# Patient Record
Sex: FEMALE | Race: WHITE | Employment: FULL TIME | URBAN - METROPOLITAN AREA
[De-identification: names, ages, dates, MRNs, and addresses within clinical notes are randomized per-mention and may not be internally consistent; named-entity substitution may affect disease eponyms.]

---

## 2017-08-04 ENCOUNTER — OFFICE VISIT (OUTPATIENT)
Dept: FAMILY MEDICINE CLINIC | Age: 62
End: 2017-08-04

## 2017-08-04 VITALS
BODY MASS INDEX: 18.58 KG/M2 | HEIGHT: 66 IN | SYSTOLIC BLOOD PRESSURE: 138 MMHG | OXYGEN SATURATION: 98 % | WEIGHT: 115.6 LBS | HEART RATE: 62 BPM | RESPIRATION RATE: 16 BRPM | DIASTOLIC BLOOD PRESSURE: 70 MMHG

## 2017-08-04 DIAGNOSIS — Z12.31 ENCOUNTER FOR SCREENING MAMMOGRAM FOR BREAST CANCER: ICD-10-CM

## 2017-08-04 DIAGNOSIS — Z11.4 SCREENING FOR HIV WITHOUT PRESENCE OF RISK FACTORS: ICD-10-CM

## 2017-08-04 DIAGNOSIS — Z23 NEED FOR PROPHYLACTIC VACCINATION AND INOCULATION AGAINST VARICELLA: ICD-10-CM

## 2017-08-04 DIAGNOSIS — S99.922A INJURY OF LEFT TOE, INITIAL ENCOUNTER: Primary | ICD-10-CM

## 2017-08-04 DIAGNOSIS — Z11.59 NEED FOR HEPATITIS C SCREENING TEST: ICD-10-CM

## 2017-08-04 PROCEDURE — 99213 OFFICE O/P EST LOW 20 MIN: CPT | Performed by: NURSE PRACTITIONER

## 2017-08-04 ASSESSMENT — ENCOUNTER SYMPTOMS
EYES NEGATIVE: 1
COUGH: 0
COLOR CHANGE: 1
CHEST TIGHTNESS: 0
GASTROINTESTINAL NEGATIVE: 1
SHORTNESS OF BREATH: 0

## 2017-08-04 ASSESSMENT — PATIENT HEALTH QUESTIONNAIRE - PHQ9
SUM OF ALL RESPONSES TO PHQ9 QUESTIONS 1 & 2: 0
SUM OF ALL RESPONSES TO PHQ QUESTIONS 1-9: 0
2. FEELING DOWN, DEPRESSED OR HOPELESS: 0
1. LITTLE INTEREST OR PLEASURE IN DOING THINGS: 0

## 2018-03-20 PROBLEM — S72.001A CLOSED RIGHT HIP FRACTURE, INITIAL ENCOUNTER (HCC): Status: ACTIVE | Noted: 2018-03-20

## 2018-03-20 PROBLEM — E87.6 ACUTE HYPOKALEMIA: Status: ACTIVE | Noted: 2018-03-20

## 2018-03-29 ENCOUNTER — HOSPITAL ENCOUNTER (OUTPATIENT)
Dept: GENERAL RADIOLOGY | Age: 63
Discharge: HOME | End: 2018-03-29
Attending: NURSE PRACTITIONER | Admitting: NURSE PRACTITIONER

## 2018-03-29 ENCOUNTER — OFFICE VISIT (OUTPATIENT)
Dept: FAMILY MEDICINE CLINIC | Age: 63
End: 2018-03-29

## 2018-03-29 VITALS
BODY MASS INDEX: 19.19 KG/M2 | DIASTOLIC BLOOD PRESSURE: 70 MMHG | SYSTOLIC BLOOD PRESSURE: 122 MMHG | WEIGHT: 119.4 LBS | OXYGEN SATURATION: 98 % | RESPIRATION RATE: 18 BRPM | HEART RATE: 74 BPM | HEIGHT: 66 IN

## 2018-03-29 DIAGNOSIS — E87.6 HYPOKALEMIA: ICD-10-CM

## 2018-03-29 DIAGNOSIS — Z87.81 S/P RIGHT HIP FRACTURE: Primary | ICD-10-CM

## 2018-03-29 DIAGNOSIS — D64.9 ANEMIA, UNSPECIFIED TYPE: ICD-10-CM

## 2018-03-29 DIAGNOSIS — E83.42 HYPOMAGNESEMIA: ICD-10-CM

## 2018-03-29 DIAGNOSIS — M85.80 OSTEOPENIA, UNSPECIFIED LOCATION: ICD-10-CM

## 2018-03-29 LAB
ALBUMIN SERPL-MCNC: 3.8 GM/DL (ref 3.4–5)
ALP BLD-CCNC: 130 IU/L (ref 40–129)
ALT SERPL-CCNC: 269 U/L (ref 10–40)
ANION GAP SERPL CALCULATED.3IONS-SCNC: 11 MMOL/L (ref 4–16)
AST SERPL-CCNC: 207 IU/L (ref 15–37)
BASOPHILS ABSOLUTE: 0.1 K/CU MM
BASOPHILS RELATIVE PERCENT: 0.6 % (ref 0–1)
BILIRUB SERPL-MCNC: 0.3 MG/DL (ref 0–1)
BUN BLDV-MCNC: 15 MG/DL (ref 6–23)
CALCIUM SERPL-MCNC: 9.6 MG/DL (ref 8.3–10.6)
CHLORIDE BLD-SCNC: 102 MMOL/L (ref 99–110)
CO2: 29 MMOL/L (ref 21–32)
CREAT SERPL-MCNC: 0.7 MG/DL (ref 0.6–1.1)
DIFFERENTIAL TYPE: ABNORMAL
EOSINOPHILS ABSOLUTE: 0.2 K/CU MM
EOSINOPHILS RELATIVE PERCENT: 1.8 % (ref 0–3)
GFR AFRICAN AMERICAN: >60 ML/MIN/1.73M2
GFR NON-AFRICAN AMERICAN: >60 ML/MIN/1.73M2
GLUCOSE FASTING: 103 MG/DL (ref 70–99)
HCT VFR BLD CALC: 32.9 % (ref 37–47)
HEMOGLOBIN: 10.7 GM/DL (ref 12.5–16)
IMMATURE NEUTROPHIL %: 0.3 % (ref 0–0.43)
LYMPHOCYTES ABSOLUTE: 3 K/CU MM
LYMPHOCYTES RELATIVE PERCENT: 33.1 % (ref 24–44)
MAGNESIUM: 1.7 MG/DL (ref 1.8–2.4)
MCH RBC QN AUTO: 30.9 PG (ref 27–31)
MCHC RBC AUTO-ENTMCNC: 32.5 % (ref 32–36)
MCV RBC AUTO: 95.1 FL (ref 78–100)
MONOCYTES ABSOLUTE: 0.7 K/CU MM
MONOCYTES RELATIVE PERCENT: 7.5 % (ref 0–4)
NUCLEATED RBC %: 0 %
PDW BLD-RTO: 13.3 % (ref 11.7–14.9)
PLATELET # BLD: 390 K/CU MM (ref 140–440)
PMV BLD AUTO: 10.5 FL (ref 7.5–11.1)
POTASSIUM SERPL-SCNC: 4.6 MMOL/L (ref 3.5–5.1)
RBC # BLD: 3.46 M/CU MM (ref 4.2–5.4)
SEGMENTED NEUTROPHILS ABSOLUTE COUNT: 5.1 K/CU MM
SEGMENTED NEUTROPHILS RELATIVE PERCENT: 56.7 % (ref 36–66)
SODIUM BLD-SCNC: 142 MMOL/L (ref 135–145)
TOTAL IMMATURE NEUTOROPHIL: 0.03 K/CU MM
TOTAL NUCLEATED RBC: 0 K/CU MM
TOTAL PROTEIN: 7.1 GM/DL (ref 6.4–8.2)
WBC # BLD: 9 K/CU MM (ref 4–10.5)

## 2018-03-29 PROCEDURE — 99215 OFFICE O/P EST HI 40 MIN: CPT | Performed by: NURSE PRACTITIONER

## 2018-03-29 ASSESSMENT — ENCOUNTER SYMPTOMS
ALLERGIC/IMMUNOLOGIC NEGATIVE: 1
EYES NEGATIVE: 1
GASTROINTESTINAL NEGATIVE: 1
RESPIRATORY NEGATIVE: 1
BLOOD IN STOOL: 0

## 2018-03-29 ASSESSMENT — PATIENT HEALTH QUESTIONNAIRE - PHQ9
1. LITTLE INTEREST OR PLEASURE IN DOING THINGS: 0
SUM OF ALL RESPONSES TO PHQ QUESTIONS 1-9: 0
SUM OF ALL RESPONSES TO PHQ9 QUESTIONS 1 & 2: 0
2. FEELING DOWN, DEPRESSED OR HOPELESS: 0

## 2018-03-29 NOTE — PATIENT INSTRUCTIONS
We are committed to providing you the best care possible. If you receive a survey after visiting one of our offices, please take time to share your experience concerning your physician office visit. These surveys are confidential and no health information about you is shared. We are eager to improve for you and we are counting on your feedback to help make that happen. You have orders to get lab work done, we will call you with the results.     Follow up with your ortho specialist as scheduled    Activity as tolerated

## 2018-03-31 ENCOUNTER — TELEPHONE (OUTPATIENT)
Dept: FAMILY MEDICINE CLINIC | Age: 63
End: 2018-03-31

## 2018-04-03 DIAGNOSIS — R94.5 ABNORMAL LIVER FUNCTION: Primary | ICD-10-CM

## 2018-04-16 ENCOUNTER — HOSPITAL ENCOUNTER (OUTPATIENT)
Dept: ULTRASOUND IMAGING | Age: 63
Discharge: HOME | End: 2018-04-16
Attending: NURSE PRACTITIONER | Admitting: NURSE PRACTITIONER

## 2018-04-16 DIAGNOSIS — R94.5 ABNORMAL LIVER FUNCTION: ICD-10-CM

## 2018-04-16 DIAGNOSIS — K76.89 OTHER SPECIFIED DISEASES OF LIVER (CODE): ICD-10-CM

## 2020-10-23 ENCOUNTER — TELEPHONE (OUTPATIENT)
Dept: FAMILY MEDICINE CLINIC | Age: 65
End: 2020-10-23

## 2020-10-23 RX ORDER — NITROFURANTOIN 25; 75 MG/1; MG/1
100 CAPSULE ORAL 2 TIMES DAILY
Qty: 14 CAPSULE | Refills: 0 | Status: SHIPPED | OUTPATIENT
Start: 2020-10-23 | End: 2020-10-30

## 2020-10-23 NOTE — TELEPHONE ENCOUNTER
Patient billy Melchor Mention patient and called and stated that she has UTI and blood in her urine. Patient would like to know if you could send something to [facility] on [address].  Please advise

## 2020-11-13 ENCOUNTER — OFFICE VISIT (OUTPATIENT)
Dept: FAMILY MEDICINE CLINIC | Age: 65
End: 2020-11-13
Payer: COMMERCIAL

## 2020-11-13 VITALS
SYSTOLIC BLOOD PRESSURE: 124 MMHG | DIASTOLIC BLOOD PRESSURE: 70 MMHG | WEIGHT: 111.2 LBS | HEART RATE: 90 BPM | BODY MASS INDEX: 17.87 KG/M2 | OXYGEN SATURATION: 98 % | HEIGHT: 66 IN

## 2020-11-13 PROCEDURE — 3017F COLORECTAL CA SCREEN DOC REV: CPT | Performed by: FAMILY MEDICINE

## 2020-11-13 PROCEDURE — 99214 OFFICE O/P EST MOD 30 MIN: CPT | Performed by: FAMILY MEDICINE

## 2020-11-13 PROCEDURE — G8427 DOCREV CUR MEDS BY ELIG CLIN: HCPCS | Performed by: FAMILY MEDICINE

## 2020-11-13 PROCEDURE — 4004F PT TOBACCO SCREEN RCVD TLK: CPT | Performed by: FAMILY MEDICINE

## 2020-11-13 PROCEDURE — G8484 FLU IMMUNIZE NO ADMIN: HCPCS | Performed by: FAMILY MEDICINE

## 2020-11-13 PROCEDURE — G8419 CALC BMI OUT NRM PARAM NOF/U: HCPCS | Performed by: FAMILY MEDICINE

## 2020-11-13 ASSESSMENT — ENCOUNTER SYMPTOMS
CONSTIPATION: 0
COUGH: 0
BACK PAIN: 0
SHORTNESS OF BREATH: 0
ABDOMINAL PAIN: 0
DIARRHEA: 0
CHEST TIGHTNESS: 0
WHEEZING: 0
SORE THROAT: 0
SINUS PRESSURE: 0

## 2020-11-13 ASSESSMENT — PATIENT HEALTH QUESTIONNAIRE - PHQ9
SUM OF ALL RESPONSES TO PHQ QUESTIONS 1-9: 0
SUM OF ALL RESPONSES TO PHQ9 QUESTIONS 1 & 2: 0
2. FEELING DOWN, DEPRESSED OR HOPELESS: 0
SUM OF ALL RESPONSES TO PHQ QUESTIONS 1-9: 0
1. LITTLE INTEREST OR PLEASURE IN DOING THINGS: 0
SUM OF ALL RESPONSES TO PHQ QUESTIONS 1-9: 0

## 2020-11-13 NOTE — PROGRESS NOTES
Prabha Shaw  [id number]  20    Chief Complaint   Patient presents with   [address]     previous [employer] patient            60years old lady with past medical history of anemia, not take any medication, does smoke tobacco, still driving, still working, denies any complaints      No past medical history on file. Past Surgical History:   Procedure Laterality Date    HIP ARTHROPLASTY Right 2018    Right Hip Hemiarthroplasty     Family History   Problem Relation Age of Onset    Cancer Sister     Other Brother      Social History     Socioeconomic History    Marital status: Single     Spouse name: Not on file    Number of children: Not on file    Years of education: Not on file    Highest education level: Not on file   Occupational History    Not on file   Social Needs    Financial resource strain: Not on file    Food insecurity     Worry: Not on file     Inability: Not on file    Transportation needs     Medical: Not on file     Non-medical: Not on file   Tobacco Use    Smoking status: Current Some Day Smoker     Packs/day: 0.50     Types: Cigarettes     Last attempt to quit: 3/20/2018     Years since quittin.6    Smokeless tobacco: Never Used   Substance and Sexual Activity    Alcohol use:  Yes     Alcohol/week: 1.0 standard drinks     Types: 1 Glasses of wine per week     Comment: glass of wine daily    Drug use: No    Sexual activity: Not on file   Lifestyle    Physical activity     Days per week: Not on file     Minutes per session: Not on file    Stress: Not on file   Relationships    Social connections     Talks on phone: Not on file     Gets together: Not on file     Attends Mu-ism service: Not on file     Active member of club or organization: Not on file     Attends meetings of clubs or organizations: Not on file     Relationship status: Not on file    Intimate partner violence     Fear of current or ex partner: Not on file     Emotionally abused: Not on file Physically abused: Not on file     Forced sexual activity: Not on file   Other Topics Concern    Not on file   Social History Narrative    Not on file       No Known Allergies  Current Outpatient Medications   Medication Sig Dispense Refill    calcium-vitamin D (OSCAL) 250-125 MG-UNIT per tablet Take 1 tablet by mouth daily       No current facility-administered medications for this visit. Review of Systems   Constitutional: Negative for activity change, appetite change, chills, fatigue and fever. HENT: Negative for congestion, postnasal drip, sinus pressure and sore throat. Respiratory: Negative for cough, chest tightness, shortness of breath and wheezing. Cardiovascular: Negative for chest pain and leg swelling. Gastrointestinal: Negative for abdominal pain, constipation and diarrhea. Genitourinary: Negative for dysuria and frequency. Musculoskeletal: Negative for back pain and gait problem. Skin: Negative for rash. Neurological: Negative for dizziness, weakness and headaches. Psychiatric/Behavioral: Negative for agitation, behavioral problems and sleep disturbance. The patient is not nervous/anxious.         Lab Results   Component Value Date    WBC 9.0 03/29/2018    HGB 10.7 (L) 03/29/2018    HCT 32.9 (L) 03/29/2018    MCV 95.1 03/29/2018     03/29/2018     Lab Results   Component Value Date     03/29/2018    K 4.6 03/29/2018     03/29/2018    CO2 29 03/29/2018    BUN 15 03/29/2018    CREATININE 0.7 03/29/2018    GLUCOSE 115 (H) 03/21/2018    CALCIUM 9.6 03/29/2018    PROT 7.1 03/29/2018    LABALBU 3.8 03/29/2018    BILITOT 0.3 03/29/2018    ALKPHOS 130 (H) 03/29/2018     (H) 03/29/2018     (H) 03/29/2018    LABGLOM >60 03/29/2018    GFRAA >60 03/29/2018     Lab Results   Component Value Date    CHOL 187 04/02/2015     Lab Results   Component Value Date    TRIG 72 04/02/2015     Lab Results   Component Value Date    HDL 73 (A) 04/02/2015     Lab Results   Component Value Date    LDLCALC 110 04/02/2015     No results found for: LABA1C  Lab Results   Component Value Date    TSH 1.330 04/02/2015         /70 (Site: Left Upper Arm, Position: Sitting, Cuff Size: Medium Adult)   Pulse 90   Ht 5' 6\" (1.676 m)   Wt 111 lb 3.2 oz (50.4 kg)   SpO2 98%   BMI 17.95 kg/m²     BP Readings from Last 3 Encounters:   11/13/20 124/70   03/29/18 122/70   03/23/18 136/67       Wt Readings from Last 3 Encounters:   11/13/20 111 lb 3.2 oz (50.4 kg)   03/29/18 119 lb 6.4 oz (54.2 kg)   03/22/18 124 lb 3.2 oz (56.3 kg)         Physical Exam  Constitutional:       General: She is not in acute distress. Appearance: Normal appearance. She is well-developed. She is not ill-appearing or diaphoretic. HENT:      Head: Normocephalic and atraumatic. Eyes:      General: No scleral icterus. Pupils: Pupils are equal, round, and reactive to light. Neck:      Musculoskeletal: Normal range of motion and neck supple. Cardiovascular:      Rate and Rhythm: Normal rate and regular rhythm. Heart sounds: Normal heart sounds. No murmur. Pulmonary:      Effort: Pulmonary effort is normal.      Breath sounds: Normal breath sounds. No wheezing or rales. Abdominal:      General: There is no distension. Palpations: Abdomen is soft. There is no mass. Tenderness: There is no abdominal tenderness. Musculoskeletal: Normal range of motion. Right lower leg: No edema. Left lower leg: No edema. Neurological:      General: No focal deficit present. Mental Status: She is alert and oriented to person, place, and time. Psychiatric:         Mood and Affect: Mood normal.         Behavior: Behavior normal.         Thought Content: Thought content normal.         Judgment: Judgment normal.         ASSESSMENT/ PLAN:    1. Anemia, unspecified type  - check blood work  - Comprehensive Metabolic Panel, Fasting;  Future  - CBC Auto Differential; Future  - T4, Free; Future  - TSH without Reflex; Future  - Vitamin B12 & Folate; Future  - MAGNESIUM; Future    2. Lipid screening  - Lipid Panel; Future    3. Colon cancer screening  - [facility] - Kareem Abdullahi MD, Gastroenterology, [facility]  -Does has colonoscopy in 2015 and recommend to do it in 5 years    4. Encounter for screening mammogram for breast cancer  - [facility] DIGITAL SCREEN W CAD BILATERAL; Future              - All old blood work reviewed with the patient  - Appropriate prescription are addressed. - After visit summery provided. - Questions answered and patient verbalizes understanding.  - Call for any problem, questions, or concerns.          Return in about 1 year (around 11/13/2021) for physical.

## 2020-11-17 RX ORDER — TRAMADOL HYDROCHLORIDE 50 MG/1
50 TABLET ORAL EVERY 6 HOURS PRN
Qty: 7 TABLET | Refills: 0 | OUTPATIENT
Start: 2020-11-17 | End: 2020-11-27

## 2020-11-18 DIAGNOSIS — D64.9 ANEMIA, UNSPECIFIED TYPE: ICD-10-CM

## 2020-11-18 DIAGNOSIS — Z13.220 LIPID SCREENING: ICD-10-CM

## 2020-11-18 LAB
A/G RATIO: 1.5 (ref 1.1–2.2)
ALBUMIN SERPL-MCNC: 4.5 G/DL (ref 3.4–5)
ALP BLD-CCNC: 73 U/L (ref 40–129)
ALT SERPL-CCNC: 23 U/L (ref 10–40)
ANION GAP SERPL CALCULATED.3IONS-SCNC: 10 MMOL/L (ref 3–16)
AST SERPL-CCNC: 24 U/L (ref 15–37)
BASOPHILS ABSOLUTE: 0.1 K/UL (ref 0–0.2)
BASOPHILS RELATIVE PERCENT: 1.1 %
BILIRUB SERPL-MCNC: 0.4 MG/DL (ref 0–1)
BUN BLDV-MCNC: 13 MG/DL (ref 7–20)
CALCIUM SERPL-MCNC: 10.3 MG/DL (ref 8.3–10.6)
CHLORIDE BLD-SCNC: 103 MMOL/L (ref 99–110)
CHOLESTEROL, TOTAL: 174 MG/DL (ref 0–199)
CO2: 28 MMOL/L (ref 21–32)
CREAT SERPL-MCNC: 0.6 MG/DL (ref 0.6–1.2)
EOSINOPHILS ABSOLUTE: 0.2 K/UL (ref 0–0.6)
EOSINOPHILS RELATIVE PERCENT: 2 %
FOLATE: 11.4 NG/ML (ref 4.78–24.2)
GFR AFRICAN AMERICAN: >60
GFR NON-AFRICAN AMERICAN: >60
GLOBULIN: 3 G/DL
GLUCOSE FASTING: 114 MG/DL (ref 70–99)
HCT VFR BLD CALC: 44.4 % (ref 36–48)
HDLC SERPL-MCNC: 67 MG/DL (ref 40–60)
HEMOGLOBIN: 15 G/DL (ref 12–16)
LDL CHOLESTEROL CALCULATED: 93 MG/DL
LYMPHOCYTES ABSOLUTE: 2.3 K/UL (ref 1–5.1)
LYMPHOCYTES RELATIVE PERCENT: 30.5 %
MAGNESIUM: 1.7 MG/DL (ref 1.8–2.4)
MCH RBC QN AUTO: 30.2 PG (ref 26–34)
MCHC RBC AUTO-ENTMCNC: 33.6 G/DL (ref 31–36)
MCV RBC AUTO: 89.7 FL (ref 80–100)
MONOCYTES ABSOLUTE: 0.5 K/UL (ref 0–1.3)
MONOCYTES RELATIVE PERCENT: 6.4 %
NEUTROPHILS ABSOLUTE: 4.5 K/UL (ref 1.7–7.7)
NEUTROPHILS RELATIVE PERCENT: 60 %
PDW BLD-RTO: 13.3 % (ref 12.4–15.4)
PLATELET # BLD: 240 K/UL (ref 135–450)
PMV BLD AUTO: 9.8 FL (ref 5–10.5)
POTASSIUM SERPL-SCNC: 4.6 MMOL/L (ref 3.5–5.1)
RBC # BLD: 4.96 M/UL (ref 4–5.2)
SODIUM BLD-SCNC: 141 MMOL/L (ref 136–145)
T4 FREE: 1.3 NG/DL (ref 0.9–1.8)
TOTAL PROTEIN: 7.5 G/DL (ref 6.4–8.2)
TRIGL SERPL-MCNC: 71 MG/DL (ref 0–150)
TSH SERPL DL<=0.05 MIU/L-ACNC: 1.07 UIU/ML (ref 0.27–4.2)
VITAMIN B-12: 521 PG/ML (ref 211–911)
VLDLC SERPL CALC-MCNC: 14 MG/DL
WBC # BLD: 7.4 K/UL (ref 4–11)

## 2020-11-30 RX ORDER — LANOLIN ALCOHOL/MO/W.PET/CERES
400 CREAM (GRAM) TOPICAL 2 TIMES DAILY
Qty: 20 TABLET | Refills: 0 | Status: SHIPPED | OUTPATIENT
Start: 2020-11-30 | End: 2021-11-19

## 2020-12-16 DIAGNOSIS — E83.42 HYPOMAGNESEMIA: ICD-10-CM

## 2020-12-16 DIAGNOSIS — R73.9 HYPERGLYCEMIA: ICD-10-CM

## 2020-12-16 LAB — MAGNESIUM: 1.8 MG/DL (ref 1.8–2.4)

## 2020-12-17 LAB
ESTIMATED AVERAGE GLUCOSE: 119.8 MG/DL
HBA1C MFR BLD: 5.8 %

## 2021-09-18 ENCOUNTER — HOSPITAL ENCOUNTER (OUTPATIENT)
Age: 66
Setting detail: OBSERVATION
Discharge: HOME | End: 2021-09-19
Attending: EMERGENCY MEDICINE | Admitting: INTERNAL MEDICINE
Payer: COMMERCIAL

## 2021-09-18 ENCOUNTER — APPOINTMENT (OUTPATIENT)
Dept: GENERAL RADIOLOGY | Age: 66
End: 2021-09-18
Payer: COMMERCIAL

## 2021-09-18 ENCOUNTER — APPOINTMENT (OUTPATIENT)
Dept: CT IMAGING | Age: 66
End: 2021-09-18
Payer: COMMERCIAL

## 2021-09-18 ENCOUNTER — APPOINTMENT (OUTPATIENT)
Dept: MRI IMAGING | Age: 66
End: 2021-09-18
Payer: COMMERCIAL

## 2021-09-18 DIAGNOSIS — R53.1 RIGHT SIDED WEAKNESS: Primary | ICD-10-CM

## 2021-09-18 PROBLEM — I63.9 STROKE DETERMINED BY CLINICAL ASSESSMENT (HCC): Status: ACTIVE | Noted: 2021-09-18

## 2021-09-18 LAB
ALBUMIN SERPL-MCNC: 3.3 GM/DL (ref 3.4–5)
ALP BLD-CCNC: 57 IU/L (ref 40–128)
ALT SERPL-CCNC: 14 U/L (ref 10–40)
ANION GAP SERPL CALCULATED.3IONS-SCNC: 11 MMOL/L (ref 4–16)
AST SERPL-CCNC: 16 IU/L (ref 15–37)
BASOPHILS ABSOLUTE: 0.1 K/CU MM
BASOPHILS RELATIVE PERCENT: 0.8 % (ref 0–1)
BILIRUB SERPL-MCNC: 0.4 MG/DL (ref 0–1)
BUN BLDV-MCNC: 16 MG/DL (ref 6–23)
CALCIUM SERPL-MCNC: 8.9 MG/DL (ref 8.3–10.6)
CHLORIDE BLD-SCNC: 100 MMOL/L (ref 99–110)
CHP ED QC CHECK: YES
CO2: 24 MMOL/L (ref 21–32)
CREAT SERPL-MCNC: 0.7 MG/DL (ref 0.6–1.1)
DIFFERENTIAL TYPE: ABNORMAL
EOSINOPHILS ABSOLUTE: 0.2 K/CU MM
EOSINOPHILS RELATIVE PERCENT: 3 % (ref 0–3)
GFR AFRICAN AMERICAN: >60 ML/MIN/1.73M2
GFR NON-AFRICAN AMERICAN: >60 ML/MIN/1.73M2
GLUCOSE BLD-MCNC: 92 MG/DL
GLUCOSE BLD-MCNC: 92 MG/DL (ref 70–99)
GLUCOSE BLD-MCNC: 99 MG/DL (ref 70–99)
HCT VFR BLD CALC: 38.4 % (ref 37–47)
HEMOGLOBIN: 12.7 GM/DL (ref 12.5–16)
IMMATURE NEUTROPHIL %: 0.4 % (ref 0–0.43)
INR BLD: 1.03 INDEX
LYMPHOCYTES ABSOLUTE: 3.4 K/CU MM
LYMPHOCYTES RELATIVE PERCENT: 46 % (ref 24–44)
MCH RBC QN AUTO: 30.1 PG (ref 27–31)
MCHC RBC AUTO-ENTMCNC: 33.1 % (ref 32–36)
MCV RBC AUTO: 91 FL (ref 78–100)
MONOCYTES ABSOLUTE: 0.5 K/CU MM
MONOCYTES RELATIVE PERCENT: 7 % (ref 0–4)
NUCLEATED RBC %: 0 %
PDW BLD-RTO: 13.2 % (ref 11.7–14.9)
PLATELET # BLD: 206 K/CU MM (ref 140–440)
PMV BLD AUTO: 10.5 FL (ref 7.5–11.1)
POTASSIUM SERPL-SCNC: 3.7 MMOL/L (ref 3.5–5.1)
PROTHROMBIN TIME: 13.3 SECONDS (ref 11.7–14.5)
RBC # BLD: 4.22 M/CU MM (ref 4.2–5.4)
SEGMENTED NEUTROPHILS ABSOLUTE COUNT: 3.1 K/CU MM
SEGMENTED NEUTROPHILS RELATIVE PERCENT: 42.8 % (ref 36–66)
SODIUM BLD-SCNC: 135 MMOL/L (ref 135–145)
TOTAL IMMATURE NEUTOROPHIL: 0.03 K/CU MM
TOTAL NUCLEATED RBC: 0 K/CU MM
TOTAL PROTEIN: 6 GM/DL (ref 6.4–8.2)
TROPONIN T: <0.01 NG/ML
WBC # BLD: 7.3 K/CU MM (ref 4–10.5)

## 2021-09-18 PROCEDURE — 71045 X-RAY EXAM CHEST 1 VIEW: CPT

## 2021-09-18 PROCEDURE — 70450 CT HEAD/BRAIN W/O DYE: CPT

## 2021-09-18 PROCEDURE — G0378 HOSPITAL OBSERVATION PER HR: HCPCS

## 2021-09-18 PROCEDURE — 85025 COMPLETE CBC W/AUTO DIFF WBC: CPT

## 2021-09-18 PROCEDURE — 85610 PROTHROMBIN TIME: CPT

## 2021-09-18 PROCEDURE — 99285 EMERGENCY DEPT VISIT HI MDM: CPT

## 2021-09-18 PROCEDURE — 82962 GLUCOSE BLOOD TEST: CPT

## 2021-09-18 PROCEDURE — 6360000004 HC RX CONTRAST MEDICATION: Performed by: EMERGENCY MEDICINE

## 2021-09-18 PROCEDURE — 80053 COMPREHEN METABOLIC PANEL: CPT

## 2021-09-18 PROCEDURE — 70496 CT ANGIOGRAPHY HEAD: CPT

## 2021-09-18 PROCEDURE — 36415 COLL VENOUS BLD VENIPUNCTURE: CPT

## 2021-09-18 PROCEDURE — 93005 ELECTROCARDIOGRAM TRACING: CPT | Performed by: EMERGENCY MEDICINE

## 2021-09-18 PROCEDURE — 84484 ASSAY OF TROPONIN QUANT: CPT

## 2021-09-18 PROCEDURE — 6370000000 HC RX 637 (ALT 250 FOR IP): Performed by: FAMILY MEDICINE

## 2021-09-18 PROCEDURE — 2580000003 HC RX 258: Performed by: FAMILY MEDICINE

## 2021-09-18 PROCEDURE — 70551 MRI BRAIN STEM W/O DYE: CPT

## 2021-09-18 RX ORDER — ACETAMINOPHEN 325 MG/1
650 TABLET ORAL EVERY 6 HOURS PRN
Status: DISCONTINUED | OUTPATIENT
Start: 2021-09-18 | End: 2021-09-19 | Stop reason: HOSPADM

## 2021-09-18 RX ORDER — SODIUM CHLORIDE 9 MG/ML
25 INJECTION, SOLUTION INTRAVENOUS PRN
Status: DISCONTINUED | OUTPATIENT
Start: 2021-09-18 | End: 2021-09-19 | Stop reason: HOSPADM

## 2021-09-18 RX ORDER — POLYETHYLENE GLYCOL 3350 17 G/17G
17 POWDER, FOR SOLUTION ORAL DAILY PRN
Status: DISCONTINUED | OUTPATIENT
Start: 2021-09-18 | End: 2021-09-19 | Stop reason: HOSPADM

## 2021-09-18 RX ORDER — SODIUM CHLORIDE 0.9 % (FLUSH) 0.9 %
10 SYRINGE (ML) INJECTION PRN
Status: DISCONTINUED | OUTPATIENT
Start: 2021-09-18 | End: 2021-09-19 | Stop reason: HOSPADM

## 2021-09-18 RX ORDER — SODIUM CHLORIDE 0.9 % (FLUSH) 0.9 %
10 SYRINGE (ML) INJECTION EVERY 12 HOURS SCHEDULED
Status: DISCONTINUED | OUTPATIENT
Start: 2021-09-18 | End: 2021-09-19 | Stop reason: HOSPADM

## 2021-09-18 RX ORDER — ATORVASTATIN CALCIUM 20 MG/1
40 TABLET, FILM COATED ORAL NIGHTLY
Status: DISCONTINUED | OUTPATIENT
Start: 2021-09-18 | End: 2021-09-19 | Stop reason: HOSPADM

## 2021-09-18 RX ORDER — ACETAMINOPHEN 650 MG/1
650 SUPPOSITORY RECTAL EVERY 6 HOURS PRN
Status: DISCONTINUED | OUTPATIENT
Start: 2021-09-18 | End: 2021-09-19 | Stop reason: HOSPADM

## 2021-09-18 RX ORDER — ASPIRIN 81 MG/1
81 TABLET, CHEWABLE ORAL DAILY
Status: DISCONTINUED | OUTPATIENT
Start: 2021-09-18 | End: 2021-09-19 | Stop reason: HOSPADM

## 2021-09-18 RX ORDER — NICOTINE 21 MG/24HR
1 PATCH, TRANSDERMAL 24 HOURS TRANSDERMAL DAILY
Status: DISCONTINUED | OUTPATIENT
Start: 2021-09-18 | End: 2021-09-19 | Stop reason: HOSPADM

## 2021-09-18 RX ORDER — ONDANSETRON 2 MG/ML
4 INJECTION INTRAMUSCULAR; INTRAVENOUS EVERY 6 HOURS PRN
Status: DISCONTINUED | OUTPATIENT
Start: 2021-09-18 | End: 2021-09-19 | Stop reason: HOSPADM

## 2021-09-18 RX ORDER — ONDANSETRON 4 MG/1
4 TABLET, ORALLY DISINTEGRATING ORAL EVERY 8 HOURS PRN
Status: DISCONTINUED | OUTPATIENT
Start: 2021-09-18 | End: 2021-09-19 | Stop reason: HOSPADM

## 2021-09-18 RX ORDER — SODIUM CHLORIDE 0.9 % (FLUSH) 0.9 %
5-40 SYRINGE (ML) INJECTION 2 TIMES DAILY
Status: DISCONTINUED | OUTPATIENT
Start: 2021-09-18 | End: 2021-09-19 | Stop reason: HOSPADM

## 2021-09-18 RX ADMIN — IOPAMIDOL 75 ML: 755 INJECTION, SOLUTION INTRAVENOUS at 04:52

## 2021-09-18 RX ADMIN — ASPIRIN 81 MG: 81 TABLET, CHEWABLE ORAL at 09:47

## 2021-09-18 RX ADMIN — SODIUM CHLORIDE, PRESERVATIVE FREE 10 ML: 5 INJECTION INTRAVENOUS at 20:52

## 2021-09-18 ASSESSMENT — PAIN SCALES - GENERAL
PAINLEVEL_OUTOF10: 0
PAINLEVEL_OUTOF10: 0

## 2021-09-18 NOTE — ED NOTES
Bed: ED-15  Expected date:   Expected time:   Means of arrival:   Comments:  Stroke alert     Melquiades Hanna RN  09/18/21 [id number]

## 2021-09-18 NOTE — ED PROVIDER NOTES
Triage Chief Complaint:   Extremity Weakness (R; R Talya Dobbs 115 [id number] 9/17/21)    [place]:  Layo De La O is a 73 y.o. female that presents as a stroke alert via EMS for right-sided weakness. Patient was last known normal around 11 PM but woke up around 3:30 AM and said she had weakness and numbness to the right side of her body including arm and leg. EMS reports that the patient also appeared to have some left sided facial drooping and slowed cognition which has been improving in route. Point-of-care glucose prior to arrival was 111. Patient's vital signs have been stable. Patient denies any history of CVA. States that she was in her normal state of health when she went to bed last night. Denies any headache or visual changes. Denies any chest pain or abdominal pain. ROS:  At least 10 systems reviewed and otherwise acutely negative except as in the [address] History reviewed. No pertinent past medical history. Past Surgical History:   Procedure Laterality Date    HIP ARTHROPLASTY Right 03/21/2018    Right Hip Hemiarthroplasty     Family History   Problem Relation Age of Onset    Cancer Sister     Other Brother      Social History     Socioeconomic History    Marital status: Single     Spouse name: Not on file    Number of children: Not on file    Years of education: Not on file    Highest education level: Not on file   Occupational History    Not on file   Tobacco Use    Smoking status: Current Some Day Smoker     Packs/day: 0.50     Types: Cigarettes     Last attempt to quit: 3/20/2018     Years since quitting: 3.5    Smokeless tobacco: Never Used   Substance and Sexual Activity    Alcohol use:  Yes     Alcohol/week: 1.0 standard drinks     Types: 1 Glasses of wine per week     Comment: glass of wine daily    Drug use: No    Sexual activity: Not on file   Other Topics Concern    Not on file   Social History Narrative    Not on file     Social Determinants of Health     Financial Resource Strain:     Difficulty of Paying Living Expenses:    Food Insecurity:     Worried About Running Out of Food in the Last Year:     [address] in the Last Year:    Transportation Needs:     Lack of Transportation (Medical):  Lack of Transportation (Non-Medical):    Physical Activity:     Days of Exercise per Week:     Minutes of Exercise per Session:    Stress:     Feeling of Stress :    Social Connections:     Frequency of Communication with Friends and Family:     Frequency of Social Gatherings with Friends and Family:     Attends Yazidi Services:     Active Member of Clubs or Organizations:     Attends Club or Organization Meetings:     Marital Status:    Intimate Partner Violence:     Fear of Current or Ex-Partner:     Emotionally Abused:     Physically Abused:     Sexually Abused:      Current Facility-Administered Medications   Medication Dose Route Frequency Provider Last Rate Last Admin    sodium chloride flush 0.9 % injection 5-40 mL  5-40 mL IntraVENous BID Nikko Chase MD         Current Outpatient Medications   Medication Sig Dispense Refill    magnesium oxide (MAG-OX) 400 (240 Mg) MG tablet Take 1 tablet by mouth 2 times daily for 10 days 20 tablet 0    calcium-vitamin D (OSCAL) 250-125 MG-UNIT per tablet Take 1 tablet by mouth daily       No Known Allergies    Nursing Notes Reviewed    Physical Exam:  ED Triage Vitals [09/18/21 0441]   Enc Vitals Group      BP       Pulse       Resp       Temp       Temp src       SpO2       Weight 111 lb (50.3 kg)      Height 5' 6\" (1.676 m)      Head Circumference       Peak Flow       Pain Score       Pain Loc       Pain Edu? Excl. in [address]? GENERAL APPEARANCE: Awake and alert. Cooperative. HEAD: Normocephalic. Atraumatic. EYES: EOM's grossly intact. Sclera anicteric. ENT: Mucous membranes are moist. Tolerates saliva. No trismus. NECK: Supple. No meningismus. Trachea midline. HEART: RRR. Radial pulses 2+. LUNGS: Respirations unlabored. CTAB  ABDOMEN: Soft. Non-tender. No guarding or rebound. EXTREMITIES: No acute deformities. SKIN: Warm and dry. NEUROLOGICAL:   Salty Rowland's NIH Stroke Scale at 5:47 AM is:  Level of Consciousness:  0 - alert; keenly responsive    LOC Questions:  0 - answers both questions correctly    LOC Commands:  0 - performs both tasks correctly    Best Gaze:  0 - normal    Visual Fields:  0 - no visual loss    Facial Palsy:  0 - normal symmetric movement    Motor-Arm-Left:  0 - no drift, limb holds 90 (or 45) degrees for full 10 seconds    Motor-Leg-Left:  0 - no drift; leg holds 30 degree position for full 5 seconds    Motor-Arm-Right:  0 - no drift, limb holds 90 (or 45) degrees for full 10 seconds    Motor-Leg-Right:  2 - some effort against gravity; leg falls to bed by 5 seconds but has some effort against gravity    Limb Ataxia:  0 - absent    Sensory:  1 - mild to moderate sensory loss; patient feels pinprick is less sharp or is dull on the affected side; there is a loss of superficial pain with pinprick but patient is aware of being touched     Best Language:  0 - no aphasia, normal    Dysarthria:  0 - normal    Extinction and Inattention:  0 - no abnormality  PSYCHIATRIC: Normal mood.     I have reviewed and interpreted all of the currently available lab results from this visit (if applicable):  Results for orders placed or performed during the hospital encounter of 09/18/21   CBC Auto Differential   Result Value Ref Range    WBC 7.3 4.0 - 10.5 K/CU MM    RBC 4.22 4.2 - 5.4 M/CU MM    Hemoglobin 12.7 12.5 - 16.0 GM/DL    Hematocrit 38.4 37 - 47 %    MCV 91.0 78 - 100 FL    MCH 30.1 27 - 31 PG    MCHC 33.1 32.0 - 36.0 %    RDW 13.2 11.7 - 14.9 %    Platelets 188 822 - 715 K/CU MM    MPV 10.5 7.5 - 11.1 FL    Differential Type AUTOMATED DIFFERENTIAL     Segs Relative 42.8 36 - 66 %    Lymphocytes % 46.0 (H) 24 - 44 %    Monocytes % 7.0 (H) 0 - 4 %    Eosinophils % 3.0 0 - 3 %    Basophils % 0.8 0 - 1 %    Segs Absolute 3.1 K/CU MM    Lymphocytes Absolute 3.4 K/CU MM    Monocytes Absolute 0.5 K/CU MM    Eosinophils Absolute 0.2 K/CU MM    Basophils Absolute 0.1 K/CU MM    Nucleated RBC % 0.0 %    Total Nucleated RBC 0.0 K/CU MM    Total Immature Neutrophil 0.03 K/CU MM    Immature Neutrophil % 0.4 0 - 0.43 %   Protime-INR   Result Value Ref Range    Protime 13.3 11.7 - 14.5 SECONDS    INR 1.03 INDEX   POCT Glucose   Result Value Ref Range    Glucose 92 mg/dL    QC OK? yes    POCT Glucose   Result Value Ref Range    POC Glucose 92 70 - 99 MG/DL   EKG 12 Lead   Result Value Ref Range    Ventricular Rate 71 BPM    Atrial Rate 71 BPM    P-R Interval 146 ms    QRS Duration 90 ms    Q-T Interval 420 ms    QTc Calculation (Bazett) 456 ms    P Axis 76 degrees    R Axis 83 degrees    T Axis 51 degrees    Diagnosis       Normal sinus rhythm  Possible Left atrial enlargement  Borderline ECG  When compared with ECG of 21-MAR-2018 07:15,  No significant change was found          Radiographs (if obtained):  [] The following radiograph was interpreted by myself in the absence of a radiologist:  [x] Radiologist's Report Reviewed:    EKG (if obtained): (All EKG's are interpreted by myself in the absence of a cardiologist)  12 lead EKG as interpreted by me reveals normal sinus rhythm. Axis is normal. There are no ischemic ST elevations or other suspicious ST changes;  QRS interval is narrow, QT interval is not prolonged. Final interpretation: Normal sinus rhythm. MDM:  1. Physician evaluation performed at:  0440  2. Stroke alert called at:  Pre hospital  3. CT results obtained at:  0456  4. Decision was made that TPA is NOT indicated in consultation with [address] [place] [address] Stroke Neurologist, Dr. Jodie Palumbo.     t-PA NOT given due to the following EXCLUSION CRITERIA:  [x] Administration of t-PA can not be initiated within 4.5 hours of onset of symptoms  [] Evidence of intracranial hemorrhage on pretreatment CT  [] Clinical presentation suggestive of subarachnoid hemorrhage (even with normal CT)  [] CT shows multilobar infarction (hypodensity of > 1/3 cerebral hemisphere)  [] Known intracranial neoplasm, arteriovenous malformation or aneurysm  [] Significant head trauma (with sustained loss of consciousness), intracranial, or  intraspinal surgery within past 3 months  [] *Blood pressure elevated (systolic > 712 mm Hg or diastolic > 817 mm Hg)   [] *Abnormal blood glucose (< 50 or > 400mg/dL)  [] Active internal bleeding  [] Known bleeding risk (including but not limited to below)   Heparin, argatroban, or bivalirudin received within 48 hours with     PTT > upper limit of normal   Platelet count < 451,431/TK6   Current or recent use of anticoagulants including but not limited to: Warfarin (Coumadin®) within 5 days or INR > 1.7     Apixiban (Eliquis®) within 48 hours**     Dabigatran (Pradaxa®) within 72 hours**     Enoxaparin (Lovenox®) within 24 hours**     Fondaparinux (Arixtra®) within 72 hours**     Rivaroxaban (Xarelto®) within 24 hours**     **For patients with normal renal function. Activity may be significantly   prolonged in the elderly or patients with renal dysfunction    *Remains thrombolytic eligible if BP/BG corrected while in treatment window    5. Patient [facility] to [facility]        Patient presents as above. She has improving right-sided deficits on presentation here with a stroke scale of 3. She has right lower extremity weakness and some subjective sensory loss on the right side. No other obvious focal neurologic deficits. She is hemodynamically stable. Blood glucose is within normal ranges. She was taken directly to CT from the EMS bay. Imaging does not show any evidence of acute intracranial abnormality. The patient symptoms are slowly resolving here in the emergency department. The patient was evaluated by Dr. Jamilah Carrera of [facility] neurology and patient will be admitted here locally for further work-up and observation.   Patient's

## 2021-09-18 NOTE — H&P
Hospitalist - History & Physical      Patient: Saturnino Montes    Unit/Bed:ED15/ED-15  YOB: 1955  MRN: [id number]   Acct: [de-identified]   PCP: Luisa Sullivan MD    Date of Service: Pt seen/examined on 09/18/21  and Admitted to Observation with expected LOS less than two midnights due to medical therapy. Chief Complaint: Right-sided weakness    Assessment and Plan:-  1. Possible TIA/stroke: This is per clinical history with the patient and negative imaging, currently on MRI due to right-sided hip replacement in 2018, start ASA and high intensity lipid-lowering therapy, check lipid profile, get echocardiogram to rule out any thromboembolic events, consult neurology for further evaluation recommendation, no need for inpatient keep for observation status for now when neurology cleared the patient consider discharge. 2. Nicotine dependent: Counseled about smoking cessation, start nicotine patches. 3. Elevated blood pressure without diagnosis of hypertension: Reviewed blood pressure measurements in the system since admission, patient not on antihypertensive medications, keep blood pressure on the higher side for now for suspected stroke for better cerebrovascular circulation, once stabilized may start the patient on one antihypertensive medications preferably chlorthalidone/calcium channel blocker. Encourage DASH diet, cut down salt intake less than 2 g/day, increase physical activity as tolerated was discharged. 4. Diastolic murmur detected per examination: Mostly heard at the pulmonary area, echocardiogram I will order to rule out any structural abnormalities of the heart. The patient mention she did have congenital abnormality of the heart.   DVT prophylaxis with Lovenox      History Of Present Illness:    80-year-old female complaining from right side weakness started around 11 PM but she mentioned she woke up in the middle of the night almost 2 AM and she is not sure when symptoms started exactly. This is the first time having right-sided weakness/numbness which lasted almost half an hour to 1 hour as she stated, she called EMS and brought her to [facility], per EMS report they noticed left-sided facial droop at that time, telemetry neuro mentioned she is not a candidate for TPA, she does have a history of right hip replacement in 2018. When I encountered the patient in the ER she was totally normal with no facial droop or right-sided weakness or any other neurological abnormalities. Patient denies any fever, chills, nausea, vomiting, loss of taste, loss of smell, any other constitutional symptoms. She is a chronic smoker more than 30 years history 5 cigarettes a day, no alcohol or drug use. Otherwise patient past medical history not significant except for smoking and he does not have any family history of strokes. It looks like the patient had a CTA head and neck did not show any acute abnormalities, will keep under observation status consult neurology for further evaluation recommendation. Past Medical History:    History reviewed. No pertinent past medical history. Past Surgical History:        Procedure Laterality Date    HIP ARTHROPLASTY Right 03/21/2018    Right Hip Hemiarthroplasty       Home Medications:   No current facility-administered medications on file prior to encounter. Current Outpatient Medications on File Prior to Encounter   Medication Sig Dispense Refill    magnesium oxide (MAG-OX) 400 (240 Mg) MG tablet Take 1 tablet by mouth 2 times daily for 10 days 20 tablet 0    calcium-vitamin D (OSCAL) 250-125 MG-UNIT per tablet Take 1 tablet by mouth daily         Allergies:    Patient has no known allergies. Social History:    reports that she has been smoking cigarettes. She has been smoking about 0.50 packs per day. She has never used smokeless tobacco. She reports current alcohol use of about 1.0 standard drinks of alcohol per week.  She reports that she does not use drugs. Family History:       Problem Relation Age of Onset   [place] Sicard Cancer Sister     Other Brother        Diet:  Diet NPO    Review of systems:   Pertinent positives as noted in the HPI. All other systems reviewed and negative. PHYSICAL EXAM:  BP (!) 143/79   Pulse 55   Temp 97.6 °F (36.4 °C)   Resp 16   Ht 5' 6\" (1.676 m)   Wt 111 lb (50.3 kg)   SpO2 96%   BMI 17.92 kg/m²   General appearance: Asthenic, appears stated age and cooperative. HEENT: Normal cephalic, atraumatic without obvious deformity. Pupils equal, round, and reactive to light. Extra ocular muscles intact. Conjunctivae/corneas clear. Uses glasses. Neck: Supple, with full range of motion. No jugular venous distention. Trachea midline. Chest: Barrel chest  Respiratory:  Normal respiratory effort. Clear to auscultation, bilaterally without Rales/Wheezes/Rhonchi. Cardiovascular: Regular rate and rhythm with normal Y4/F8, diastolic murmur appreciated grade 3. Abdomen: Soft, non-tender, non-distended with normal bowel sounds. Musculoskeletal:  No clubbing, cyanosis or edema bilaterally. Skin: Skin color, texture, turgor normal.  No rashes or lesions. Neurologic:  Neurovascularly intact without any focal sensory/motor deficits. Cranial nerves: II-XII intact, grossly non-focal.  Psychiatric: Alert and oriented, thought content appropriate, normal insight  Capillary Refill: Brisk,< 3 seconds   Peripheral Pulses: +2 palpable, equal bilaterally     Labs:   Recent Labs     09/18/21  0456   WBC 7.3   HGB 12.7   HCT 38.4        Recent Labs     09/18/21  0456      K 3.7      CO2 24   BUN 16   CREATININE 0.7   CALCIUM 8.9     Recent Labs     09/18/21  0456   AST 16   ALT 14   BILITOT 0.4   ALKPHOS 57     Recent Labs     09/18/21  0456   INR 1.03     No results for input(s): Primo Beerjoe in the last 72 hours.     Urinalysis:    Lab Results   Component Value Date    NITRU NEGATIVE 09/04/2016    WBCUA 159 09/04/2016 BACTERIA OCCASIONAL 09/04/2016    RBCUA 1,936 09/04/2016    BLOODU LARGE 09/04/2016    SPECGRAV 1.009 09/04/2016       Radiology:   XR CHEST PORTABLE   Preliminary Result   No acute cardiopulmonary process. CTA HEAD NECK W CONTRAST   Final Result   Fetal origin of the right posterior cerebral artery. Otherwise, unremarkable CTA of the head and neck. RECOMMENDATIONS:   For clinical suspicion of acute ischemia, recommend MRI brain with   diffusion-weighted imaging for further evaluation         CT HEAD WO CONTRAST   Preliminary Result   No acute intracranial abnormality. CT HEAD WO CONTRAST    Result Date: 9/18/2021  EXAMINATION: CT OF THE HEAD WITHOUT CONTRAST  9/18/2021 4:52 am TECHNIQUE: CT of the head was performed without the administration of intravenous contrast. Dose modulation, iterative reconstruction, and/or weight based adjustment of the mA/kV was utilized to reduce the radiation dose to as low as reasonably achievable. COMPARISON: None HISTORY: ORDERING SYSTEM PROVIDED HISTORY: cva TECHNOLOGIST PROVIDED HISTORY: Has a \"code stroke\" or \"stroke alert\" been called? ->Yes Reason for exam:->cva Decision Support Exception - unselect if not a suspected or confirmed emergency medical condition->Emergency Medical Condition (MA) FINDINGS: BRAIN/VENTRICLES: There is no acute intracranial hemorrhage, mass effect or midline shift. No abnormal extra-axial fluid collection. The gray-white differentiation is maintained without evidence of an acute infarct. There is no evidence of hydrocephalus. ORBITS: The visualized portion of the orbits demonstrate no acute abnormality. SINUSES: The visualized paranasal sinuses and mastoid air cells demonstrate no acute abnormality. SOFT TISSUES/SKULL:  No acute abnormality of the visualized skull or soft tissues. No acute intracranial abnormality.      XR CHEST PORTABLE    Result Date: 9/18/2021  EXAMINATION: ONE XRAY VIEW OF THE CHEST 9/18/2021 significant stenosis of the intracranial internal carotid, anterior cerebral, or middle cerebral arteries. No aneurysm. POSTERIOR CIRCULATION: Fetal origin of the right posterior cerebral artery is present. No significant stenosis of the vertebral, basilar, or posterior cerebral arteries. No aneurysm. OTHER: No dural venous sinus thrombosis on this non-dedicated study. Mild centrilobular emphysematous changes incidentally noted within the visualized upper lungs. BRAIN: No mass effect or midline shift. No extra-axial fluid collection. The gray-white differentiation is maintained. Fetal origin of the right posterior cerebral artery. Otherwise, unremarkable CTA of the head and neck.  RECOMMENDATIONS: For clinical suspicion of acute ischemia, recommend MRI brain with diffusion-weighted imaging for further evaluation         EKG: Normal sinus rhythm, possible left atrial enlargement    Electronically signed by Libby Taylor MD on 9/18/2021 at 7:59 AM

## 2021-09-18 NOTE — ED NOTES
Pt taken immediately to CT scan. Stroke alert called per EMS and physician awake. .  Pt alert and oriented X4, significant right sided weakness arm and leg     Morris Goel RN  09/18/21 [id number]

## 2021-09-18 NOTE — ED NOTES
Pt states she woke up and the symptoms started. States she woke up with the symptoms.       Luna Moore RN  09/18/21 5022

## 2021-09-18 NOTE — ED NOTES
osu called back and on hold waiting for neurologist. telestroke bedside.      Anai Hartman, MARCY  09/18/21 1842

## 2021-09-18 NOTE — ED NOTES
Spoke with inpatient physician at this time. He updated this RN that patient is not a candidate for MRI d/t hip replacement on the right side. Pt currently able to lift leg and walk independently.      Luna Moore RN  09/18/21 4772

## 2021-09-18 NOTE — Clinical Note
Patient Class: Inpatient [101]   REQUIRED: Diagnosis: Stroke determined by clinical assessment [facility] [[id number]]   Estimated Length of Stay: Estimated stay of more than 2 midnights   Telemetry/Cardiac Monitoring Required?: Yes

## 2021-09-19 VITALS
TEMPERATURE: 97.6 F | WEIGHT: 111 LBS | BODY MASS INDEX: 17.84 KG/M2 | SYSTOLIC BLOOD PRESSURE: 132 MMHG | OXYGEN SATURATION: 98 % | RESPIRATION RATE: 18 BRPM | HEIGHT: 66 IN | HEART RATE: 71 BPM | DIASTOLIC BLOOD PRESSURE: 77 MMHG

## 2021-09-19 LAB
ALBUMIN SERPL-MCNC: 3.9 GM/DL (ref 3.4–5)
ALP BLD-CCNC: 60 IU/L (ref 40–129)
ALT SERPL-CCNC: 14 U/L (ref 10–40)
ANION GAP SERPL CALCULATED.3IONS-SCNC: 10 MMOL/L (ref 4–16)
AST SERPL-CCNC: 19 IU/L (ref 15–37)
BILIRUB SERPL-MCNC: 0.5 MG/DL (ref 0–1)
BUN BLDV-MCNC: 15 MG/DL (ref 6–23)
CALCIUM SERPL-MCNC: 8.9 MG/DL (ref 8.3–10.6)
CHLORIDE BLD-SCNC: 103 MMOL/L (ref 99–110)
CO2: 25 MMOL/L (ref 21–32)
CREAT SERPL-MCNC: 0.5 MG/DL (ref 0.6–1.1)
GFR AFRICAN AMERICAN: >60 ML/MIN/1.73M2
GFR NON-AFRICAN AMERICAN: >60 ML/MIN/1.73M2
GLUCOSE BLD-MCNC: 95 MG/DL (ref 70–99)
HCT VFR BLD CALC: 41.6 % (ref 37–47)
HEMOGLOBIN: 14.1 GM/DL (ref 12.5–16)
MCH RBC QN AUTO: 30.9 PG (ref 27–31)
MCHC RBC AUTO-ENTMCNC: 33.9 % (ref 32–36)
MCV RBC AUTO: 91.2 FL (ref 78–100)
PDW BLD-RTO: 13.1 % (ref 11.7–14.9)
PLATELET # BLD: 240 K/CU MM (ref 140–440)
PMV BLD AUTO: 10.9 FL (ref 7.5–11.1)
POTASSIUM SERPL-SCNC: 3.9 MMOL/L (ref 3.5–5.1)
RBC # BLD: 4.56 M/CU MM (ref 4.2–5.4)
SODIUM BLD-SCNC: 138 MMOL/L (ref 135–145)
TOTAL PROTEIN: 6.7 GM/DL (ref 6.4–8.2)
WBC # BLD: 8.1 K/CU MM (ref 4–10.5)

## 2021-09-19 PROCEDURE — 83721 ASSAY OF BLOOD LIPOPROTEIN: CPT

## 2021-09-19 PROCEDURE — 6370000000 HC RX 637 (ALT 250 FOR IP): Performed by: FAMILY MEDICINE

## 2021-09-19 PROCEDURE — 6370000000 HC RX 637 (ALT 250 FOR IP): Performed by: INTERNAL MEDICINE

## 2021-09-19 PROCEDURE — 6360000002 HC RX W HCPCS: Performed by: FAMILY MEDICINE

## 2021-09-19 PROCEDURE — 82746 ASSAY OF FOLIC ACID SERUM: CPT

## 2021-09-19 PROCEDURE — 80061 LIPID PANEL: CPT

## 2021-09-19 PROCEDURE — 80053 COMPREHEN METABOLIC PANEL: CPT

## 2021-09-19 PROCEDURE — 2580000003 HC RX 258: Performed by: FAMILY MEDICINE

## 2021-09-19 PROCEDURE — 85027 COMPLETE CBC AUTOMATED: CPT

## 2021-09-19 PROCEDURE — 96372 THER/PROPH/DIAG INJ SC/IM: CPT

## 2021-09-19 PROCEDURE — 99205 OFFICE O/P NEW HI 60 MIN: CPT | Performed by: STUDENT IN AN ORGANIZED HEALTH CARE EDUCATION/TRAINING PROGRAM

## 2021-09-19 PROCEDURE — 6370000000 HC RX 637 (ALT 250 FOR IP): Performed by: NURSE PRACTITIONER

## 2021-09-19 PROCEDURE — G0378 HOSPITAL OBSERVATION PER HR: HCPCS

## 2021-09-19 PROCEDURE — 84443 ASSAY THYROID STIM HORMONE: CPT

## 2021-09-19 RX ORDER — CLOPIDOGREL BISULFATE 75 MG/1
300 TABLET ORAL ONCE
Status: COMPLETED | OUTPATIENT
Start: 2021-09-19 | End: 2021-09-19

## 2021-09-19 RX ORDER — LISINOPRIL 5 MG/1
5 TABLET ORAL DAILY
Status: DISCONTINUED | OUTPATIENT
Start: 2021-09-19 | End: 2021-09-19 | Stop reason: HOSPADM

## 2021-09-19 RX ORDER — CLOPIDOGREL BISULFATE 75 MG/1
75 TABLET ORAL DAILY
Qty: 20 TABLET | Refills: 0 | Status: SHIPPED | OUTPATIENT
Start: 2021-09-20

## 2021-09-19 RX ORDER — ATORVASTATIN CALCIUM 40 MG/1
40 TABLET, FILM COATED ORAL NIGHTLY
Qty: 30 TABLET | Refills: 0 | Status: SHIPPED | OUTPATIENT
Start: 2021-09-19

## 2021-09-19 RX ORDER — CLOPIDOGREL BISULFATE 75 MG/1
75 TABLET ORAL DAILY
Status: DISCONTINUED | OUTPATIENT
Start: 2021-09-20 | End: 2021-09-19 | Stop reason: HOSPADM

## 2021-09-19 RX ORDER — ASPIRIN 81 MG/1
81 TABLET, CHEWABLE ORAL DAILY
Qty: 30 TABLET | Refills: 0 | Status: SHIPPED | OUTPATIENT
Start: 2021-09-20

## 2021-09-19 RX ADMIN — CLOPIDOGREL BISULFATE 300 MG: 75 TABLET ORAL at 10:24

## 2021-09-19 RX ADMIN — ENOXAPARIN SODIUM 40 MG: 40 INJECTION SUBCUTANEOUS at 10:25

## 2021-09-19 RX ADMIN — LISINOPRIL 5 MG: 5 TABLET ORAL at 10:24

## 2021-09-19 RX ADMIN — ASPIRIN 81 MG: 81 TABLET, CHEWABLE ORAL at 10:24

## 2021-09-19 RX ADMIN — SODIUM CHLORIDE, PRESERVATIVE FREE 10 ML: 5 INJECTION INTRAVENOUS at 10:26

## 2021-09-19 ASSESSMENT — PAIN SCALES - GENERAL: PAINLEVEL_OUTOF10: 0

## 2021-09-19 NOTE — CONSULTS
[address] [place] [address], [address] [facility]                                  CONSULTATION    PATIENT NAME: Joan Saab                        :        1955  MED REC NO:   [id number]                          ROOM:       OBS04  ACCOUNT NO:   [de-identified]                           ADMIT DATE: 2021  PROVIDER:     Samm Cueva MD    CONSULT DATE:  2021    INDICATION:  TIA. HISTORY OF PRESENT ILLNESS:  A 58-year-old female patient who follows in  the office. She came to the hospital with right upper extremity and  right lower extremity weakness present, and the symptoms have completely  resolved. She denies any chest pain, no shortness of breath. No other   or GI complaints present. The patient was seen by Neurology today,  and recommending a CATINA also. PAST MEDICAL HISTORY:  The patient has no prior history of stroke or  TIA, but on the MRI it appears that she probably had some TIAs in the  past.  No history of diabetes, hypertension, or hyperlipidemia. PAST SURGICAL HISTORY:  Hip surgery. SOCIAL HISTORY:  She smokes about half a pack a day. No alcohol use. ALLERGIES:  NKDA. MEDICATIONS:  Her medications at home she was on is none. FAMILY HISTORY:  Significant for hypertension. PHYSICAL EXAMINATION:  GENERAL:  The patient is awake, alert, and answers question, not in  acute distress. VITAL SIGNS:  Temperature afebrile, pulse is 60, and blood pressure is  115/75. HEENT:  Head is normocephalic, atraumatic. Pupils are equal and  reactive. CHEST:  Equal expansion. LUNGS:  Clear to auscultation. No wheezing or rhonchi appreciated. HEART:  Regular rate and rhythm. ABDOMEN:  Soft, nontender. Bowel sounds are present. No  hepatosplenomegaly or guarding appreciated. EXTREMITIES:  No cyanosis or clubbing noted. NEUROLOGIC:  Cranial nerves II through XII are grossly intact.     LABORATORIES:  BUN 15 and creatinine 0.5. Troponins are negative. LFTs  are normal.  CBC is within normal range. Her MRI of the brain shows  that she has a tiny subacute infarction of the parietal periventricular  white matter, older lacunar infarct in the right cerebral hemisphere  present. A CTA was performed, which shows no significant carotid  disease was noted. IMPRESSION:  This is a 69-year-old female patient who comes to the  hospital with having TIA symptoms. From a cardiac stand, I think the  patient will be discharged home. We will plan for a CATINA as an  outpatient. We will get an event monitor to rule out paroxysmal atrial  fibrillation. The patient will be started on aspirin, Plavix, and statins, and also  place on low-dose ACE inhibitors as her blood pressure is elevated, and  I highly recommend to quit smoking.         Mary Bernstein MD    D: 09/19/2021 10:03:20       T: 09/19/2021 10:06:02     [email or web address]  Job#: [id number]     Doc#: [id number]    CC:

## 2021-09-19 NOTE — CONSULTS
Neurology Service Consult Note  [facility]  Patient Name: Brea So  : 1955        Subjective:   Reason for consult: \"I had weakness of my right side\"  73 y.o. right-handed female with PMH of ASD presenting to [facility] with complaints of RUE and RLE weakness, patient reports she woke up yesterday morning went to the bathroom and realized she had right arm and leg weakness, she fell to the ground, she could not get herself up, she had numbness on the right side of her body, her roommate called 911, once she got to the ER patient had resolved symptoms. She reports she has an ASD. She has not had an echo since   She states she does feel her heart beating heavily frequently but does not feel that it is fast.     Patient reports that she has been very fatigued recently, she reports this is not normal, she is typically very active and she has been exhausted. Patient reports she has never had a stroke in the past    She states her speech was not affected  Paramedics noticed facial droop    She denies CP and SOB  She is back to baseline today       History reviewed.  No pertinent past medical history. :   Past Surgical History:   Procedure Laterality Date    HIP ARTHROPLASTY Right 2018    Right Hip Hemiarthroplasty     Medications:  Scheduled Meds:   clopidogrel  300 mg Oral Once    Followed by    Cabrera Sharma ON 2021] clopidogrel  75 mg Oral Daily    sodium chloride flush  5-40 mL IntraVENous BID    aspirin  81 mg Oral Daily    atorvastatin  40 mg Oral Nightly    nicotine  1 patch TransDERmal Daily    sodium chloride flush  10 mL IntraVENous 2 times per day    enoxaparin  40 mg SubCUTAneous Daily     Continuous Infusions:   sodium chloride       PRN Meds:.sodium chloride flush, sodium chloride, ondansetron **OR** ondansetron, polyethylene glycol, acetaminophen **OR** acetaminophen    No Known Allergies  Social History     Socioeconomic History    Marital status: Single     Spouse name: Not on file Number of children: Not on file    Years of education: Not on file    Highest education level: Not on file   Occupational History    Not on file   Tobacco Use    Smoking status: Current Some Day Smoker     Packs/day: 0.50     Types: Cigarettes     Last attempt to quit: 3/20/2018     Years since quitting: 3.5    Smokeless tobacco: Never Used   Substance and Sexual Activity    Alcohol use: Yes     Alcohol/week: 1.0 standard drinks     Types: 1 Glasses of wine per week     Comment: glass of wine daily    Drug use: No    Sexual activity: Not on file   Other Topics Concern    Not on file   Social History Narrative    Not on file     Social Determinants of Health     Financial Resource Strain:     Difficulty of Paying Living Expenses:    Food Insecurity:     Worried About [address] in the Last Year:     Ran Out of Food in the Last Year:    Transportation Needs:     Lack of Transportation (Medical):     Lack of Transportation (Non-Medical):    Physical Activity:     Days of Exercise per Week:     Minutes of Exercise per Session:    Stress:     Feeling of Stress :    Social Connections:     Frequency of Communication with Friends and Family:     Frequency of Social Gatherings with Friends and Family:     Attends [employer] Services:      Active Member of Clubs or Organizations:     Attends Club or Organization Meetings:     Marital Status:    Intimate Partner Violence:     Fear of Current or Ex-Partner:     Emotionally Abused:     Physically Abused:     Sexually Abused:       Family History   Problem Relation Age of Onset    Cancer Sister     Other Brother          ROS (10 systems)  In addition to that documented in the HPI above, the additional ROS was obtained:  Constitutional: Denies fevers or chills  Eyes: Denies vision changes  ENMT: Denies sore throat  CV: Denies chest pain  Resp: Denies SOB  GI: Denies vomiting or diarrhea  : Denies painful urination  MSK: Denies recent trauma  Skin: Denies new rashes  Neuro: Denies new numbness or tingling or weakness  Endocrine: Denies unexpected weight loss  Heme: Denies bleeding disorders    Physical Exam:       [unfilled]   Wt Readings from Last 3 Encounters:   09/18/21 111 lb (50.3 kg)   11/13/20 111 lb 3.2 oz (50.4 kg)   03/29/18 119 lb 6.4 oz (54.2 kg)     Temp Readings from Last 3 Encounters:   09/19/21 97.6 °F (36.4 °C) (Oral)   03/23/18 98.5 °F (36.9 °C) (Oral)   06/28/17 98.1 °F (36.7 °C) (Oral)     BP Readings from Last 3 Encounters:   09/19/21 132/77   11/13/20 124/70   03/29/18 122/70     Pulse Readings from Last 3 Encounters:   09/19/21 60   11/13/20 90   03/29/18 74        Gen: A&O x 4, NAD, cooperative  HEENT: NC/AT, EOMI, PERRL, mmm,  neck supple, no meningeal signs; Heart: regular   Lungs: no distress  Ext: no edema, no calf tenderness b/l  Psych: normal mood and affect  Skin: no rashes or lesions    NEUROLOGIC EXAM:    Mental Status: A&O to self, location, month and year, NAD, speech clear, language fluent, repetition and naming intact, follows commands appropriately    Cranial Nerve Exam:   CN II-XII: , PERRL, VFF, no nystagmus, no gaze paresis, sensation V1-V3 intact b/l, muscles of facial expression symmetric; hearing intact to conversational tone, palate elevates symmetrically, shoulder elevation symmetric and tongue protrudes midline with movement side to side.     Motor Exam:       Strength 5/5 UE's/LE's b/l  Tone and bulk normal   No pronator drift    Deep Tendon Reflexes: 2/4 biceps, triceps, brachioradialis, patellar, and achilles b/l; flexor plantar responses b/l    Sensation: Intact light touch/temp UE's/LE's b/l    Coordination/Cerebellum:       Tremors--none      Rapidly alternating movements: no dysdiadochokinesia b/l                Finger-to-Nose: no dysmetria b/l    Gait and stance:      Gait: deferred      LABS:     Recent Labs     09/18/21  0456 09/18/21  0506 09/19/21  0540   WBC 7.3  --  8.1     --  138   K 3.7  --  3.9   CL 100  --  103   CO2 24  --  25   BUN 16  --  15   CREATININE 0.7  --  0.5*   GLUCOSE 99 92 95   INR 1.03  --   --      Hemoglobin a1c pending  Lipid panel pending       IMAGING:    MRI brain:  Tiny subacute infarction in the left parietal periventricular white matter. Old lacunar infarct in the right cerebellar hemisphere. Mild chronic microvascular disease. CTA; Fetal origin of the right posterior cerebral artery. Otherwise, unremarkable CTA of the head and neck. RECOMMENDATIONS:   For clinical suspicion of acute ischemia, recommend MRI brain with   diffusion-weighted imaging for further evaluation         Personally reviewed and agree with the above impression       ASSESSMENT/PLAN:     73year old female with reported RUE and RLE weakness and numbness likely secondary to TIA. Feel this is less likely secondary to Subacute L punctate parietal lobe infarction as the timeframe does not match. However, does have evidence of previous right cerebellar infarct and known ASD. Need to evaluate for cardiombolic etiology. Plan of care as follows:  Neuro Exam:  Non focal   Neurodiagnostics:  MRI brain as above  CTA as above  ECHO pending--->Zan? Medications:  DAPT x21 days  Statin   PT/OT/ST:  Per their recommendations   Follow up: Will discuss case with cardiology          Thank you for allowing us to participate in the care of your patient. If there are any questions regarding evaluation please feel free to contact us. TOSIN Samaniego - CNP, 9/19/2021    ------------------------------------    Attending Note:  I have rounded on this patient with Vlad Silveira CNP. I have reviewed the chart and we have discussed this case in detail. The patient was seen and examined by myself. Pertinent labs and imaging have been personally reviewed. Our findings and impressions were discussed with the patient. I concur with the Nurse Practioner's assessment and plan. Concering for TIA.  Will place patient on DAPT and statin. Will continue DAPT x21 days after which she should continue on monotherapy alone. Asked cardiology to evaluate given history of ASD and evidence of multi-territory strokes on MRI and description of palpitations. Cardiology planning to do a CATINA on outpatient basis. Patient okay for discharge from neurologic standpoint.      Mary Anne Goldman DO 9/19/2021 10:08 AM

## 2021-09-19 NOTE — CONSULTS
Will dictate full note  Discussed with neuro  Plan for CATINA tomorrow  Patient follows in office    Electronically signed by Eric Nick MD on 9/19/21 at 9:15 AM EDT      [id number] [address] for home  Keep on ASA 81mg and plavix 75 mg daily and statin  Add low dose ACEI  Will schedule CATINA and event monitor as outpatient     Dictated --[id number]

## 2021-09-19 NOTE — PLAN OF CARE
Problem: HEMODYNAMIC STATUS  Goal: Patient has stable vital signs and fluid balance  Outcome: Completed     Problem: ACTIVITY INTOLERANCE/IMPAIRED MOBILITY  Goal: Mobility/activity is maintained at optimum level for patient  Outcome: Completed     Problem: COMMUNICATION IMPAIRMENT  Goal: Ability to express needs and understand communication  Outcome: Completed

## 2021-09-19 NOTE — PROGRESS NOTES
Reviewed discharge instructions, pt verbalized understanding. IV removed. Pt ambulated out of facility without difficulty.

## 2021-09-20 LAB
EKG ATRIAL RATE: 71 BPM
EKG DIAGNOSIS: NORMAL
EKG P AXIS: 76 DEGREES
EKG P-R INTERVAL: 146 MS
EKG Q-T INTERVAL: 420 MS
EKG QRS DURATION: 90 MS
EKG QTC CALCULATION (BAZETT): 456 MS
EKG R AXIS: 83 DEGREES
EKG T AXIS: 51 DEGREES
EKG VENTRICULAR RATE: 71 BPM

## 2021-09-20 PROCEDURE — 93010 ELECTROCARDIOGRAM REPORT: CPT | Performed by: INTERNAL MEDICINE

## 2021-09-20 NOTE — DISCHARGE SUMMARY
Discharge Summary    Date: 9/20/2021  Patient Name: Alondra Zapata YOB: 1955 Age: 73 y.o. Admit Date: 9/18/2021  Discharge Date: 9/19/2021  Discharge Condition: Good    Admission Diagnosis  Right sided weakness (R53.1); Stroke determined by clinical assessment ([facility] [address] (I63.9)     Discharge Diagnosis  Active Problems: Stroke determined by clinical assessment ([facility])Resolved Problems: * No resolved hospital problems. [facility] Stay  Narrative of Hospital Course:  HPI: 44-year-old female complaining from right side weakness started around 6 PM but she mentioned she woke up in the middle of the night almost 2 AM and she is not sure when symptoms started exactly. This is the first time having right-sided weakness/numbness which lasted almost half an hour to 1 hour as she stated, she called EMS and brought her to [facility], per EMS report they noticed left-sided facial droop at that time, telemetry neuro mentioned she is not a candidate for TPA, she does have a history of right hip replacement in 2018. When I encountered the patient in the ER she was totally normal with no facial droop or right-sided weakness or any other neurological abnormalities. Patient denies any fever, chills, nausea, vomiting, loss of taste, loss of smell, any other constitutional symptoms. She is a chronic smoker more than 30 years history 5 cigarettes a day, no alcohol or drug use. She does not have any family history of strokes. It looks like the patient had a CTA head and neck did not show any acute abnormalities, will keep under observation status and consult neurology for further evaluation recommendation. Assessment and plan:  Pt was evaluated by neurology for TIA as she reported RUE and RLE weakness and numbness. Her symptoms have completely resolved. Even though imaging showed subacute L punctate parietal lobe infarction, the timeframe does not match her acute symtoms.  However, she does have evidence of previous right cerebellar infarct and known ASD. She follows with cardiology and was seen by them in the hospital. She was started on aspirin, Plavix, and statins. Her blood pressure was slightly elevate. She would need antihypertensive if it remains so. She was advised to quit smoking and follow with her cardiologist in 2 weeks. Consultants:  IP CONSULT TO HOSPITALISTIP CONSULT TO NEUROLOGYIP CONSULT TO CARDIOLOGY    Surgeries/procedures Performed:       Treatments:           Discharge Plan/Disposition:  Home    Hospital/Incidental Findings Requiring Follow Up:    Patient Instructions:    Diet:    Activity:  For number of days (if applicable): Other Instructions:    Provider Follow-Up:   No follow-ups on file.      Significant Diagnostic Studies:    Recent Labs:  Admission on 09/18/2021, Discharged on 09/19/2021Ventricular Rate                           Date: 09/18/2021Value: 71          Ref range: BPM                Status: FinalAtrial Rate                                   Date: 09/18/2021Value: 71          Ref range: BPM                Status: FinalP-R Interval                                  Date: 09/18/2021Value: 146         Ref range: ms                 Status: FinalQRS Duration                                  Date: 09/18/2021Value: 90          Ref range: ms                 Status: FinalQ-T Interval                                  Date: 09/18/2021Value: 420         Ref range: ms                 Status: FinalQTc Calculation (Bazett)                      Date: 09/18/2021Value: 456         Ref range: ms                 Status: FinalP Axis                                        Date: 09/18/2021Value: 76          Ref range: degrees            Status: FinalR Axis                                        Date: 09/18/2021Value: 83          Ref range: degrees            Status: FinalT Axis                                        Date: 09/18/2021Value: 51          Ref range: degrees            Status: FinalDiagnosis Date: 09/18/2021Value:               Status: Final                 Value:Normal sinus rhythmPossible Left atrial enlargementBorderline ECGWhen compared with ECG of 21-MAR-2018 07:15,No significant change was foundConfirmed by Rusty Prater MD, Ania Bowman ([id number]) on 9/20/2021 7:45:36 AMGlucose                                       Date: 09/18/2021Value: 92          Ref range: mg/dL              Status: FinalQC OK?                                         Date: 09/18/2021Value: yes           Status: [address] [facility]                                           Date: 09/18/2021Value: 7.3         Ref range: 4.0 - 10.5 K/CU *  Status: FinalRBC                                           Date: 09/18/2021Value: 4.22        Ref range: 4.2 - 5.4 M/CU MM  Status: FinalHemoglobin                                    Date: 09/18/2021Value: 12.7        Ref range: 12.5 - 16.0 GM/DL  Status: FinalHematocrit                                    Date: 09/18/2021Value: 38.4        Ref range: 37 - 47 %          Status: FinalMCV                                           Date: 09/18/2021Value: 91.0        Ref range: 78 - 100 FL        Status: [address]                                           Date: 09/18/2021Value: 30.1        Ref range: 27 - 31 PG         Status: [address]                                          Date: 09/18/2021Value: 33.1        Ref range: 32.0 - 36.0 %      Status: FinalRDW                                           Date: 09/18/2021Value: 13.2        Ref range: 11.7 - 14.9 %      Status: FinalPlatelets                                     Date: 09/18/2021Value: 206         Ref range: 140 - 440 K/CU MM  Status: FinalMPV                                           Date: 09/18/2021Value: 10.5        Ref range: 7.5 - 11.1 FL      Status: FinalDifferential Type                             Date: 09/18/2021Value: AUTOMATED DIFFERENTIAL                     Status: FinalSegs Relative                                 Date: 09/18/2021Value: 42.8        Ref range: 36 - 66 %          Status: FinalLymphocytes %                                 Date: 09/18/2021Value: 46.0*       Ref range: 24 - 44 %          Status: FinalMonocytes %                                   Date: 09/18/2021Value: 7.0*        Ref range: 0 - 4 %            Status: FinalEosinophils %                                 Date: 09/18/2021Value: 3.0         Ref range: 0 - 3 %            Status: FinalBasophils %                                   Date: 09/18/2021Value: 0.8         Ref range: 0 - 1 %            Status: FinalSegs Absolute                                 Date: 09/18/2021Value: 3.1         Ref range: K/CU MM            Status: FinalLymphocytes Absolute                          Date: 09/18/2021Value: 3.4         Ref range: K/CU MM            Status: FinalMonocytes Absolute                            Date: 09/18/2021Value: 0.5         Ref range: K/CU MM            Status: FinalEosinophils Absolute                          Date: 09/18/2021Value: 0.2         Ref range: K/CU MM            Status: FinalBasophils Absolute                            Date: 09/18/2021Value: 0.1         Ref range: K/CU MM            Status: FinalNucleated RBC %                               Date: 09/18/2021Value: 0.0         Ref range: %                  Status: FinalTotal Nucleated RBC                           Date: 09/18/2021Value: 0.0         Ref range: K/CU MM            Status: FinalTotal Immature Neutrophil                     Date: 09/18/2021Value: 0.03        Ref range: K/CU MM            Status: FinalImmature Neutrophil %                         Date: 09/18/2021Value: 0.4         Ref range: 0 - 0.43 %         Status: FinalSodium                                        Date: 09/18/2021Value: 135         Ref range: 135 - 145 MMOL/L   Status: FinalPotassium                                     Date: 09/18/2021Value: 3.7         Ref range: 3.5 - 5.1 MMOL/L   Status: FinalChloride Date: 09/18/2021Value: 100         Ref range: 99 - 110 mMol/L    Status: FinalCO2                                           Date: 09/18/2021Value: 24          Ref range: 21 - 32 MMOL/L     Status: FinalBUN                                           Date: 09/18/2021Value: 16          Ref range: 6 - 23 MG/DL       Status: FinalCREATININE                                    Date: 09/18/2021Value: 0.7         Ref range: 0.6 - 1.1 MG/DL    Status: FinalGlucose                                       Date: 09/18/2021Value: 99          Ref range: 70 - 99 MG/DL      Status: FinalCalcium                                       Date: 09/18/2021Value: 8.9         Ref range: 8.3 - 10.6 MG/DL   Status: FinalAlbumin                                       Date: 09/18/2021Value: 3.3*        Ref range: 3.4 - 5.0 GM/DL    Status: FinalTotal Protein                                 Date: 09/18/2021Value: 6.0*        Ref range: 6.4 - 8.2 GM/DL    Status: FinalTotal Bilirubin                               Date: 09/18/2021Value: 0.4         Ref range: 0.0 - 1.0 MG/DL    Status: FinalALT                                           Date: 09/18/2021Value: 14          Ref range: 10 - 40 U/L        Status: FinalAST                                           Date: 09/18/2021Value: 16          Ref range: 15 - 37 IU/L       Status: FinalAlkaline Phosphatase                          Date: 09/18/2021Value: 57          Ref range: 40 - 128 IU/L      Status: FinalGFR Non-                      Date: 09/18/2021Value: >60         Ref range: >60 mL/min/1.73m2  Status: FinalGFR                           Date: 09/18/2021Value: >60         Ref range: >60 mL/min/1.73m2  Status: FinalAnion Gap                                     Date: 09/18/2021Value: 11          Ref range: 4 - 16             Status: FinalProtime                                       Date: 09/18/2021Value: 13.3        Ref range: 11.7 - 14.5 SECO*  Status: Final              Comment: Protime seconds can varydue to reagent sensitivity. Please use INR to monitororal anticoagulants. INR                                           Date: 09/18/2021Value: 1.03        Ref range: INDEX              Status: Final              Comment: THERAPEUTIC RANGE:INDICATIONS: INR 2.0-3.0  Most (DVT, PE,Atrial Fibillation, Bioprosthetic valve, [place] bicuspid aortic valve)INDICATIONS: 2.5-3.5 Most mechanical valvesrecurrent thrombosis. Troponin T                                    Date: 09/18/2021Value: <0.010      Ref range: <0.01 NG/ML        Status: Final              Comment:       Patients with high levels of Biotin oral intake(ie >5 mg/day) may have falsely decreased Troponinlevels. Samples collected within 8 hours of Biotinintake may require addtional information for diagnosis. POC Glucose                                   Date: 09/18/2021Value: 92          Ref range: 70 - 99 MG/DL      Status: FinalSodium                                        Date: 09/19/2021Value: 138         Ref range: 135 - 145 MMOL/L   Status: FinalPotassium                                     Date: 09/19/2021Value: 3.9         Ref range: 3.5 - 5.1 MMOL/L   Status: FinalChloride                                      Date: 09/19/2021Value: 103         Ref range: 99 - 110 mMol/L    Status: FinalCO2                                           Date: 09/19/2021Value: 25          Ref range: 21 - 32 MMOL/L     Status: FinalBUN                                           Date: 09/19/2021Value: 15          Ref range: 6 - 23 MG/DL       Status: FinalCREATININE                                    Date: 09/19/2021Value: 0.5*        Ref range: 0.6 - 1.1 MG/DL    Status: FinalGlucose                                       Date: 09/19/2021Value: 95          Ref range: 70 - 99 MG/DL      Status: FinalCalcium                                       Date: 09/19/2021Value: 8.9         Ref range: 8.3 - 10.6 MG/DL   Status: FinalAlbumin                                       Date: 09/19/2021Value: 3.9         Ref range: 3.4 - 5.0 GM/DL    Status: FinalTotal Protein                                 Date: 09/19/2021Value: 6.7         Ref range: 6.4 - 8.2 GM/DL    Status: FinalTotal Bilirubin                               Date: 09/19/2021Value: 0.5         Ref range: 0.0 - 1.0 MG/DL    Status: FinalALT                                           Date: 09/19/2021Value: 14          Ref range: 10 - 40 U/L        Status: FinalAST                                           Date: 09/19/2021Value: 19          Ref range: 15 - 37 IU/L       Status: FinalAlkaline Phosphatase                          Date: 09/19/2021Value: 60          Ref range: 40 - 129 IU/L      Status: FinalGFR Non-                      Date: 09/19/2021Value: >60         Ref range: >60 mL/min/1.73m2  Status: FinalGFR                           Date: 09/19/2021Value: >60         Ref range: >60 mL/min/1.73m2  Status: FinalAnion Gap                                     Date: 09/19/2021Value: 10          Ref range: 4 - 16             Status: [address] [facility]                                           Date: 09/19/2021Value: 8.1         Ref range: 4.0 - 10.5 K/CU *  Status: FinalRBC                                           Date: 09/19/2021Value: 4.56        Ref range: 4.2 - 5.4 M/CU MM  Status: FinalHemoglobin                                    Date: 09/19/2021Value: 14.1        Ref range: 12.5 - 16.0 GM/DL  Status: FinalHematocrit                                    Date: 09/19/2021Value: 41.6        Ref range: 37 - 47 %          Status: FinalMCV                                           Date: 09/19/2021Value: 91.2        Ref range: 78 - 100 FL        Status: [address]                                           Date: 09/19/2021Value: 30.9        Ref range: 27 - 31 PG         Status: [address]                                          Date: 09/19/2021Value: 33.9        Ref range: 32.0 - 36.0 %      Status: FinalRDW                                           Date: 09/19/2021Value: 13.1        Ref range: 11.7 - 14.9 %      Status: FinalPlatelets                                     Date: 09/19/2021Value: 240         Ref range: 140 - 440 K/CU MM  Status: FinalMPV                                           Date: 09/19/2021Value: 10.9        Ref range: 7.5 - 11.1 FL      Status: Final------------    Radiology last 7 days:  CT HEAD WO CONTRASTResult Date: 9/18/2021No acute intracranial abnormality. Findings were discussed with Mario Orantes at 5:01 a.m. on 9/18/2021. XR CHEST PORTABLEResult Date: 9/18/2021No acute cardiopulmonary process. CTA HEAD NECK W CONTRASTResult Date: 9/18/2021Fetal origin of the right posterior cerebral artery. Otherwise, unremarkable CTA of the head and neck. RECOMMENDATIONS: For clinical suspicion of acute ischemia, recommend MRI brain with diffusion-weighted imaging for further evaluation MRI BRAIN WO CONTRASTResult Date: 9/18/2021Tiny subacute infarction in the left parietal periventricular white matter. Old lacunar infarct in the right cerebellar hemisphere. Mild chronic microvascular disease.       [unfilled]    Discharge Medications    Discharge Medication List as of 9/19/2021  1:17 PMSTART taking these medicationsaspirin 81 MG chewable tabletTake 1 tablet by mouth daily, Disp-30 tablet, R-0Printatorvastatin (LIPITOR) 40 MG tabletTake 1 tablet by mouth nightly, Disp-30 tablet, R-0Printclopidogrel (PLAVIX) 75 MG tabletTake 1 tablet by mouth daily, Disp-20 tablet, R-0Print    Discharge Medication List as of 9/19/2021  1:17 PM    Discharge Medication List as of 9/19/2021  1:17 PMCONTINUE these medications which have NOT CHANGEDmagnesium oxide (MAG-OX) 400 (240 Mg) MG tabletTake 1 tablet by mouth 2 times daily for 10 days, Disp-20 tablet, R-0Normalcalcium-vitamin D (OSCAL) 250-125 MG-UNIT per tabletTake 1 tablet by mouth dailyHistorical Med    Discharge Medication List as of 9/19/2021  1:17 PM    Time Spent on Discharge:E] minutes were spent in patient examination, evaluation, counseling as well as medication reconciliation, prescriptions for required medications, discharge plan, and follow up.     Electronically signed by Luana Ramos MD on 9/20/21 at 2:21 PM EDT

## 2021-09-24 ENCOUNTER — HOSPITAL ENCOUNTER (OUTPATIENT)
Dept: LAB | Age: 66
Discharge: HOME | End: 2021-09-24
Payer: COMMERCIAL

## 2021-09-24 PROCEDURE — U0003 INFECTIOUS AGENT DETECTION BY NUCLEIC ACID (DNA OR RNA); SEVERE ACUTE RESPIRATORY SYNDROME CORONAVIRUS 2 (SARS-COV-2) (CORONAVIRUS DISEASE [COVID-19]), AMPLIFIED PROBE TECHNIQUE, MAKING USE OF HIGH THROUGHPUT TECHNOLOGIES AS DESCRIBED BY CMS-2020-01-R: HCPCS

## 2021-09-24 PROCEDURE — U0005 INFEC AGEN DETEC AMPLI PROBE: HCPCS

## 2021-09-25 LAB
SARS-COV-2: NOT DETECTED
SOURCE: NORMAL

## 2021-09-29 ENCOUNTER — HOSPITAL ENCOUNTER (OUTPATIENT)
Dept: NON INVASIVE DIAGNOSTICS | Age: 66
Discharge: HOME | End: 2021-09-29
Payer: COMMERCIAL

## 2021-09-29 VITALS
SYSTOLIC BLOOD PRESSURE: 136 MMHG | HEART RATE: 51 BPM | DIASTOLIC BLOOD PRESSURE: 66 MMHG | RESPIRATION RATE: 11 BRPM | OXYGEN SATURATION: 100 %

## 2021-09-29 LAB
LV EF: 53 %
LVEF MODALITY: NORMAL

## 2021-09-29 PROCEDURE — 93312 ECHO TRANSESOPHAGEAL: CPT

## 2021-09-29 PROCEDURE — 7100000001 HC PACU RECOVERY - ADDTL 15 MIN

## 2021-09-29 PROCEDURE — 93308 TTE F-UP OR LMTD: CPT

## 2021-09-29 PROCEDURE — 7100000000 HC PACU RECOVERY - FIRST 15 MIN

## 2021-09-29 PROCEDURE — 99152 MOD SED SAME PHYS/QHP 5/>YRS: CPT

## 2021-09-29 NOTE — PROCEDURES
[address] [address] [facility], [address] [facility]                                 ECHOCARDIOGRAM    PATIENT NAME: Hoang Dorsey                        :        1955  MED REC NO:   [id number]                          ROOM:  ACCOUNT NO:   [de-identified]                           ADMIT DATE: 2021  PROVIDER:     Lemont Cheadle, MD    CATINA    INDICATION:  Stroke, rule out cardioembolic source. This is a 70-year-old female patient brought to noninvasive cath lab  today. The patient received 4 mg of Versed and 25 mg of Fentanyl. A  mouthguard was placed. CATINA probe was advanced to the posterior  oropharynx and mid esophagus. Images were obtained. Left atrium is normal in size, no clot or thrombus noted in the left  atrium or left atrial appendage. Mitral valve is normal.  Mild mitral  regurgitation is noted. LV function is preserved. EF is around 50-55%. No pericardial effusion was noted. No ASD or PFO was noted. Color  Doppler and bubble study, both were negative. Tricuspid valve is  normal.  Pulmonic valve is normal.  Mitral valve is normal.   Mild-to-moderate mitral regurgitation. Aortic valve is sclerotic. It  is trileaflet. There is moderate aortic regurgitation noted. There is  an eccentric jet towards the anterior mitral valve leaflet present. Ascending aorta is normal.  Mild plaque is noted in the descending  aorta. IMPRESSION:  1. No clot or thrombus noted in the left atrium, left atrial appendage  and LV cavity. 2.  LV function is preserved, 50-55%. 3.  Mild mitral regurgitation is noted. 4.  No ASD or PFO is present. 5.  Color Doppler and bubble study, both were negative. 6.  Tricuspid valve is normal.  7.  Pulmonic valve is normal.  8.  Mitral valve is normal.  9.  Aortic valve is sclerotic, it is trileaflet. Moderate aortic  regurgitation present.   It is directed towards the anterior mitral valve  leaflet. 10.  Mild plaque is noted in the descending aorta. 11. The patient tolerated the procedure well. No complications noted. 12.  No obvious cardioembolic source was noted for stroke.         Nanci Closs, MD    D: 09/29/2021 9:13:44       T: 09/29/2021 9:16:02     [email or web address]  Job#: [id number]     Doc#: [id number]    CC:

## 2021-09-29 NOTE — H&P
[address] [place] [address], [address] [place] [address]                              HISTORY AND PHYSICAL    PATIENT NAME: Quynh Fermin                        :        1955  MED REC NO:   [id number]                          ROOM:  ACCOUNT NO:   [de-identified]                           ADMIT DATE: 2021  PROVIDER:     Ana Roach MD    INDICATION:  The patient is here for CATINA. HISTORY OF PRESENT ILLNESS:  This is a 78-year-old female patient who  had a stroke. The patient had a MRI of the brain done and MRI showed  multiple areas of infarct was noted and  therefore CATINA is being  performed to rule out cardioembolic source. The patient had tiny  subacute infarction of left parietal periventricular white matter, old  lacunar infarct of the right cerebellum and microvascular disease  present and noted. Therefore she is here for CATINA to rule out  cardiovascular source for her stroke. PAST MEDICAL HISTORY:  History of stroke. Smoker. No diabetes, no  hypertension, no hyperlipidemia present. PAST SURGICAL HISTORY:  Hip surgery. SOCIAL HISTORY:  She is down to 2-3 cigarettes per day. No alcohol use. ALLERGIES:  NKDA. MEDICATIONS:  She is on aspirin, Lipitor and Plavix. PHYSICAL EXAMINATION:  GENERAL:  The patient is awake, alert, and answering questions, not in  acute distress. VITAL SIGNS:  Temperature is afebrile, pulse is 60, blood pressure  135/70. HEENT:  Head is normocephalic and atraumatic. Pupils are equal and  reactive to light. CHEST:  Equal expansion. LUNGS:  Clear to auscultation. No wheezing or rhonchi. HEART:  Regular rate and rhythm. ABDOMEN:  Soft and nontender. Bowel sounds are present. No  hepatosplenomegaly or guarding appreciated. EXTREMITIES:  No cyanosis or clubbing noted. NEUROLOGIC:  Cranial nerves II through XII grossly intact.     LABORATORY DATA:  Her labs are recently normal.    IMPRESSION:  A 78-year-old female patient who was admitted recently to  the hospital with having a stroke. The patient is here for CATINA to rule  out cardioembolic source of her stroke. Her ASA is 2, Mallampati is 2. Further recommendation based on hospital course.         Nanci Closs, MD    D: 09/29/2021 8:26:20       T: 09/29/2021 8:28:43     [email or web address]  Job#: [id number]     Doc#: [id number]    CC:

## 2021-11-19 ENCOUNTER — OFFICE VISIT (OUTPATIENT)
Dept: FAMILY MEDICINE CLINIC | Age: 66
End: 2021-11-19
Payer: COMMERCIAL

## 2021-11-19 VITALS
WEIGHT: 111.2 LBS | BODY MASS INDEX: 17.87 KG/M2 | OXYGEN SATURATION: 98 % | DIASTOLIC BLOOD PRESSURE: 82 MMHG | SYSTOLIC BLOOD PRESSURE: 150 MMHG | HEIGHT: 66 IN | HEART RATE: 59 BPM

## 2021-11-19 DIAGNOSIS — N30.00 ACUTE CYSTITIS WITHOUT HEMATURIA: ICD-10-CM

## 2021-11-19 DIAGNOSIS — Z00.00 ANNUAL PHYSICAL EXAM: Primary | ICD-10-CM

## 2021-11-19 DIAGNOSIS — Z12.31 ENCOUNTER FOR SCREENING MAMMOGRAM FOR BREAST CANCER: ICD-10-CM

## 2021-11-19 DIAGNOSIS — R39.198 ABNORMAL URINATION: ICD-10-CM

## 2021-11-19 DIAGNOSIS — Z86.73 H/O TIA (TRANSIENT ISCHEMIC ATTACK) AND STROKE: ICD-10-CM

## 2021-11-19 LAB
BILIRUBIN, POC: NEGATIVE
BLOOD URINE, POC: NORMAL
CLARITY, POC: CLEAR
COLOR, POC: YELLOW
GLUCOSE URINE, POC: NEGATIVE
KETONES, POC: NEGATIVE
LEUKOCYTE EST, POC: NORMAL
NITRITE, POC: NEGATIVE
PH, POC: 7
PROTEIN, POC: NEGATIVE
SPECIFIC GRAVITY, POC: 1.01
UROBILINOGEN, POC: NORMAL

## 2021-11-19 PROCEDURE — 4004F PT TOBACCO SCREEN RCVD TLK: CPT | Performed by: FAMILY MEDICINE

## 2021-11-19 PROCEDURE — 4040F PNEUMOC VAC/ADMIN/RCVD: CPT | Performed by: FAMILY MEDICINE

## 2021-11-19 PROCEDURE — 1090F PRES/ABSN URINE INCON ASSESS: CPT | Performed by: FAMILY MEDICINE

## 2021-11-19 PROCEDURE — 1123F ACP DISCUSS/DSCN MKR DOCD: CPT | Performed by: FAMILY MEDICINE

## 2021-11-19 PROCEDURE — G8484 FLU IMMUNIZE NO ADMIN: HCPCS | Performed by: FAMILY MEDICINE

## 2021-11-19 PROCEDURE — 3017F COLORECTAL CA SCREEN DOC REV: CPT | Performed by: FAMILY MEDICINE

## 2021-11-19 PROCEDURE — G8400 PT W/DXA NO RESULTS DOC: HCPCS | Performed by: FAMILY MEDICINE

## 2021-11-19 PROCEDURE — 99214 OFFICE O/P EST MOD 30 MIN: CPT | Performed by: FAMILY MEDICINE

## 2021-11-19 PROCEDURE — 81002 URINALYSIS NONAUTO W/O SCOPE: CPT | Performed by: FAMILY MEDICINE

## 2021-11-19 PROCEDURE — G8419 CALC BMI OUT NRM PARAM NOF/U: HCPCS | Performed by: FAMILY MEDICINE

## 2021-11-19 PROCEDURE — G8427 DOCREV CUR MEDS BY ELIG CLIN: HCPCS | Performed by: FAMILY MEDICINE

## 2021-11-19 RX ORDER — NITROFURANTOIN 25; 75 MG/1; MG/1
100 CAPSULE ORAL 2 TIMES DAILY
Qty: 14 CAPSULE | Refills: 0 | Status: SHIPPED | OUTPATIENT
Start: 2021-11-19 | End: 2021-11-26

## 2021-11-19 NOTE — PROGRESS NOTES
Faith Common  1955 11/21/21    Chief Complaint   Patient presents with    Annual Exam    Urinary Frequency     has noticed an odor and cloudiness with her urine for the past month. No pain or burning with urination           Patient here for physical exam, and also c/o frequent urination, going on for  still driving, still working,also c/o urinary frequency, and bad smell going on for few wks, no abdominal pain or pressure, no dysuria, no n/v, and no fever. Past Medical History:   Diagnosis Date    ASD (atrial septal defect)      Past Surgical History:   Procedure Laterality Date    HIP ARTHROPLASTY Right 03/21/2018    Right Hip Hemiarthroplasty     Family History   Problem Relation Age of Onset    Cancer Sister     Other Brother      Social History     Socioeconomic History    Marital status: Single     Spouse name: Not on file    Number of children: Not on file    Years of education: Not on file    Highest education level: Not on file   Occupational History    Not on file   Tobacco Use    Smoking status: Current Some Day Smoker     Packs/day: 0.50     Types: Cigarettes     Last attempt to quit: 3/20/2018     Years since quitting: 3.6    Smokeless tobacco: Never Used   Substance and Sexual Activity    Alcohol use: Yes     Alcohol/week: 1.0 standard drink     Types: 1 Glasses of wine per week     Comment: glass of wine daily    Drug use: No    Sexual activity: Not on file   Other Topics Concern    Not on file   Social History Narrative    Not on file     Social Determinants of Health     Financial Resource Strain:     Difficulty of Paying Living Expenses: Not on file   Food Insecurity:     Worried About Running Out of Food in the Last Year: Not on file    Jasmine of Food in the Last Year: Not on file   Transportation Needs:     Lack of Transportation (Medical): Not on file    Lack of Transportation (Non-Medical):  Not on file   Physical Activity:     Days of Exercise per Week: Not on for dizziness, weakness and headaches. Psychiatric/Behavioral: Negative for agitation, behavioral problems and sleep disturbance. The patient is not nervous/anxious. Lab Results   Component Value Date    WBC 8.1 09/19/2021    HGB 14.1 09/19/2021    HCT 41.6 09/19/2021    MCV 91.2 09/19/2021     09/19/2021     Lab Results   Component Value Date     09/19/2021    K 3.9 09/19/2021     09/19/2021    CO2 25 09/19/2021    BUN 15 09/19/2021    CREATININE 0.5 (L) 09/19/2021    GLUCOSE 95 09/19/2021    CALCIUM 8.9 09/19/2021    PROT 6.7 09/19/2021    LABALBU 3.9 09/19/2021    BILITOT 0.5 09/19/2021    ALKPHOS 60 09/19/2021    AST 19 09/19/2021    ALT 14 09/19/2021    LABGLOM >60 09/19/2021    GFRAA >60 09/19/2021    AGRATIO 1.5 11/18/2020    GLOB 3.0 11/18/2020     Lab Results   Component Value Date    CHOL 174 11/18/2020    CHOL 187 04/02/2015     Lab Results   Component Value Date    TRIG 71 11/18/2020    TRIG 72 04/02/2015     Lab Results   Component Value Date    HDL 67 (H) 11/18/2020    HDL 73 (A) 04/02/2015     Lab Results   Component Value Date    LDLCALC 93 11/18/2020    LDLCALC 110 04/02/2015     Lab Results   Component Value Date    LABA1C 5.8 12/16/2020     Lab Results   Component Value Date    TSH 1.07 11/18/2020         BP (!) 150/82   Pulse 59   Ht 5' 6\" (1.676 m)   Wt 111 lb 3.2 oz (50.4 kg)   SpO2 98%   BMI 17.95 kg/m²     BP Readings from Last 3 Encounters:   11/19/21 (!) 150/82   09/29/21 136/66   09/19/21 132/77       Wt Readings from Last 3 Encounters:   11/19/21 111 lb 3.2 oz (50.4 kg)   09/18/21 111 lb (50.3 kg)   11/13/20 111 lb 3.2 oz (50.4 kg)         Physical Exam  Constitutional:       General: She is not in acute distress. Appearance: Normal appearance. She is well-developed. She is not ill-appearing or diaphoretic. HENT:      Head: Normocephalic and atraumatic. Eyes:      General: No scleral icterus.      Pupils: Pupils are equal, round, and reactive to light. Cardiovascular:      Rate and Rhythm: Normal rate and regular rhythm. Heart sounds: Normal heart sounds. No murmur heard. Pulmonary:      Effort: Pulmonary effort is normal.      Breath sounds: Normal breath sounds. No wheezing or rales. Abdominal:      General: There is no distension. Palpations: Abdomen is soft. There is no mass. Tenderness: There is no abdominal tenderness. There is no right CVA tenderness or left CVA tenderness. Musculoskeletal:         General: Normal range of motion. Cervical back: Normal range of motion and neck supple. Right lower leg: No edema. Left lower leg: No edema. Neurological:      General: No focal deficit present. Mental Status: She is alert and oriented to person, place, and time. Psychiatric:         Mood and Affect: Mood normal.         Behavior: Behavior normal.         ASSESSMENT/ PLAN:    1. Annual physical exam  - stable    2. Abnormal urination  - POCT Urinalysis no Micro    3. Acute cystitis without hematuria  - antibiotic sent to the pharmacy    4. H/O TIA (transient ischemic attack) and stroke  - stable    5. Encounter for screening mammogram for breast cancer  - REEMA DIGITAL SCREEN W CAD BILATERAL; Future              - All old blood work reviewed with the patient  - Appropriate prescription are addressed. - After visit summery provided. - Questions answered and patient verbalizes understanding.  - Call for any problem, questions, or concerns. Return in about 3 months (around 2/19/2022) for medicare wellness.

## 2021-11-21 PROBLEM — Z86.73 H/O TIA (TRANSIENT ISCHEMIC ATTACK) AND STROKE: Status: ACTIVE | Noted: 2021-11-21

## 2021-11-21 PROBLEM — N30.00 ACUTE CYSTITIS WITHOUT HEMATURIA: Status: ACTIVE | Noted: 2021-11-21

## 2021-11-21 PROBLEM — R39.198 ABNORMAL URINATION: Status: ACTIVE | Noted: 2021-11-21

## 2021-11-21 ASSESSMENT — ENCOUNTER SYMPTOMS
COUGH: 0
BACK PAIN: 0
CHEST TIGHTNESS: 0
SHORTNESS OF BREATH: 0
NAUSEA: 0
ABDOMINAL PAIN: 0
VOMITING: 0

## 2022-01-18 ENCOUNTER — HOSPITAL ENCOUNTER (OUTPATIENT)
Dept: WOMENS IMAGING | Age: 67
Discharge: HOME | End: 2022-01-18
Payer: COMMERCIAL

## 2022-01-18 DIAGNOSIS — Z12.31 ENCOUNTER FOR SCREENING MAMMOGRAM FOR BREAST CANCER: ICD-10-CM

## 2022-01-18 PROCEDURE — 77067 SCR MAMMO BI INCL CAD: CPT

## 2022-02-22 ENCOUNTER — OFFICE VISIT (OUTPATIENT)
Dept: FAMILY MEDICINE CLINIC | Age: 67
End: 2022-02-22
Payer: COMMERCIAL

## 2022-02-22 VITALS
BODY MASS INDEX: 18.09 KG/M2 | HEIGHT: 66 IN | SYSTOLIC BLOOD PRESSURE: 124 MMHG | HEART RATE: 61 BPM | DIASTOLIC BLOOD PRESSURE: 78 MMHG | WEIGHT: 112.6 LBS | OXYGEN SATURATION: 99 %

## 2022-02-22 DIAGNOSIS — R73.9 HYPERGLYCEMIA: ICD-10-CM

## 2022-02-22 DIAGNOSIS — E78.2 MIXED HYPERLIPIDEMIA: ICD-10-CM

## 2022-02-22 DIAGNOSIS — Z86.73 H/O TIA (TRANSIENT ISCHEMIC ATTACK) AND STROKE: ICD-10-CM

## 2022-02-22 DIAGNOSIS — Z00.00 INITIAL MEDICARE ANNUAL WELLNESS VISIT: Primary | ICD-10-CM

## 2022-02-22 LAB
A/G RATIO: 1.5 (ref 1.1–2.2)
ALBUMIN SERPL-MCNC: 4.4 G/DL (ref 3.4–5)
ALP BLD-CCNC: 72 U/L (ref 40–129)
ALT SERPL-CCNC: 18 U/L (ref 10–40)
ANION GAP SERPL CALCULATED.3IONS-SCNC: 13 MMOL/L (ref 3–16)
AST SERPL-CCNC: 20 U/L (ref 15–37)
BILIRUB SERPL-MCNC: 0.5 MG/DL (ref 0–1)
BUN BLDV-MCNC: 14 MG/DL (ref 7–20)
CALCIUM SERPL-MCNC: 9.3 MG/DL (ref 8.3–10.6)
CHLORIDE BLD-SCNC: 103 MMOL/L (ref 99–110)
CHOLESTEROL, TOTAL: 138 MG/DL (ref 0–199)
CO2: 25 MMOL/L (ref 21–32)
CREAT SERPL-MCNC: 0.7 MG/DL (ref 0.6–1.2)
GFR AFRICAN AMERICAN: >60
GFR NON-AFRICAN AMERICAN: >60
GLUCOSE FASTING: 103 MG/DL (ref 70–99)
HDLC SERPL-MCNC: 60 MG/DL (ref 40–60)
LDL CHOLESTEROL CALCULATED: 65 MG/DL
POTASSIUM SERPL-SCNC: 4.1 MMOL/L (ref 3.5–5.1)
SODIUM BLD-SCNC: 141 MMOL/L (ref 136–145)
T4 FREE: 1.4 NG/DL (ref 0.9–1.8)
TOTAL PROTEIN: 7.4 G/DL (ref 6.4–8.2)
TRIGL SERPL-MCNC: 65 MG/DL (ref 0–150)
TSH SERPL DL<=0.05 MIU/L-ACNC: 1.29 UIU/ML (ref 0.27–4.2)
VLDLC SERPL CALC-MCNC: 13 MG/DL

## 2022-02-22 PROCEDURE — 1123F ACP DISCUSS/DSCN MKR DOCD: CPT | Performed by: FAMILY MEDICINE

## 2022-02-22 PROCEDURE — G0438 PPPS, INITIAL VISIT: HCPCS | Performed by: FAMILY MEDICINE

## 2022-02-22 PROCEDURE — G8484 FLU IMMUNIZE NO ADMIN: HCPCS | Performed by: FAMILY MEDICINE

## 2022-02-22 PROCEDURE — 4040F PNEUMOC VAC/ADMIN/RCVD: CPT | Performed by: FAMILY MEDICINE

## 2022-02-22 PROCEDURE — 3017F COLORECTAL CA SCREEN DOC REV: CPT | Performed by: FAMILY MEDICINE

## 2022-02-22 RX ORDER — METOPROLOL SUCCINATE 25 MG/1
TABLET, EXTENDED RELEASE ORAL
COMMUNITY
Start: 2022-02-20

## 2022-02-22 ASSESSMENT — PATIENT HEALTH QUESTIONNAIRE - PHQ9
6. FEELING BAD ABOUT YOURSELF - OR THAT YOU ARE A FAILURE OR HAVE LET YOURSELF OR YOUR FAMILY DOWN: 0
SUM OF ALL RESPONSES TO PHQ QUESTIONS 1-9: 0
9. THOUGHTS THAT YOU WOULD BE BETTER OFF DEAD, OR OF HURTING YOURSELF: 0
5. POOR APPETITE OR OVEREATING: 0
SUM OF ALL RESPONSES TO PHQ QUESTIONS 1-9: 0
1. LITTLE INTEREST OR PLEASURE IN DOING THINGS: 0
4. FEELING TIRED OR HAVING LITTLE ENERGY: 0
SUM OF ALL RESPONSES TO PHQ QUESTIONS 1-9: 0
8. MOVING OR SPEAKING SO SLOWLY THAT OTHER PEOPLE COULD HAVE NOTICED. OR THE OPPOSITE, BEING SO FIGETY OR RESTLESS THAT YOU HAVE BEEN MOVING AROUND A LOT MORE THAN USUAL: 0
3. TROUBLE FALLING OR STAYING ASLEEP: 0
3. TROUBLE FALLING OR STAYING ASLEEP: 0
9. THOUGHTS THAT YOU WOULD BE BETTER OFF DEAD, OR OF HURTING YOURSELF: 0
7. TROUBLE CONCENTRATING ON THINGS, SUCH AS READING THE NEWSPAPER OR WATCHING TELEVISION: 0
SUM OF ALL RESPONSES TO PHQ QUESTIONS 1-9: 0
4. FEELING TIRED OR HAVING LITTLE ENERGY: 0
SUM OF ALL RESPONSES TO PHQ9 QUESTIONS 1 & 2: 0
1. LITTLE INTEREST OR PLEASURE IN DOING THINGS: 0
2. FEELING DOWN, DEPRESSED OR HOPELESS: 0
6. FEELING BAD ABOUT YOURSELF - OR THAT YOU ARE A FAILURE OR HAVE LET YOURSELF OR YOUR FAMILY DOWN: 0
8. MOVING OR SPEAKING SO SLOWLY THAT OTHER PEOPLE COULD HAVE NOTICED. OR THE OPPOSITE, BEING SO FIGETY OR RESTLESS THAT YOU HAVE BEEN MOVING AROUND A LOT MORE THAN USUAL: 0
10. IF YOU CHECKED OFF ANY PROBLEMS, HOW DIFFICULT HAVE THESE PROBLEMS MADE IT FOR YOU TO DO YOUR WORK, TAKE CARE OF THINGS AT HOME, OR GET ALONG WITH OTHER PEOPLE: 0
7. TROUBLE CONCENTRATING ON THINGS, SUCH AS READING THE NEWSPAPER OR WATCHING TELEVISION: 0
5. POOR APPETITE OR OVEREATING: 0
SUM OF ALL RESPONSES TO PHQ QUESTIONS 1-9: 0
10. IF YOU CHECKED OFF ANY PROBLEMS, HOW DIFFICULT HAVE THESE PROBLEMS MADE IT FOR YOU TO DO YOUR WORK, TAKE CARE OF THINGS AT HOME, OR GET ALONG WITH OTHER PEOPLE: 0
SUM OF ALL RESPONSES TO PHQ QUESTIONS 1-9: 0

## 2022-02-22 ASSESSMENT — LIFESTYLE VARIABLES
HAVE YOU OR SOMEONE ELSE BEEN INJURED AS A RESULT OF YOUR DRINKING: 0
HOW OFTEN DURING THE LAST YEAR HAVE YOU NEEDED AN ALCOHOLIC DRINK FIRST THING IN THE MORNING TO GET YOURSELF GOING AFTER A NIGHT OF HEAVY DRINKING: 0
HAS A RELATIVE, FRIEND, DOCTOR, OR ANOTHER HEALTH PROFESSIONAL EXPRESSED CONCERN ABOUT YOUR DRINKING OR SUGGESTED YOU CUT DOWN: 0
HOW OFTEN DURING THE LAST YEAR HAVE YOU HAD A FEELING OF GUILT OR REMORSE AFTER DRINKING: 0
HOW OFTEN DURING THE LAST YEAR HAVE YOU FOUND THAT YOU WERE NOT ABLE TO STOP DRINKING ONCE YOU HAD STARTED: 0
HOW OFTEN DURING THE LAST YEAR HAVE YOU BEEN UNABLE TO REMEMBER WHAT HAPPENED THE NIGHT BEFORE BECAUSE YOU HAD BEEN DRINKING: 0
HOW OFTEN DURING THE LAST YEAR HAVE YOU FAILED TO DO WHAT WAS NORMALLY EXPECTED FROM YOU BECAUSE OF DRINKING: 0
HOW MANY STANDARD DRINKS CONTAINING ALCOHOL DO YOU HAVE ON A TYPICAL DAY: 1 OR 2
HOW OFTEN DO YOU HAVE A DRINK CONTAINING ALCOHOL: 2-3 TIMES A WEEK

## 2022-02-22 NOTE — PROGRESS NOTES
Medicare Annual Wellness Visit    Marilyn Pedersen is here for Medicare AWV    [address] was seen today for medicare awv.  -doing fine. Still driving  Working on quit smoking  Lives with her girlfriend  Colonoscopy done 2021 by bob Segura next one will be in 5 years  Mammogram done 1/22 was fine    Diagnoses and all orders for this visit:    Initial Medicare annual wellness visit    H/O TIA (transient ischemic attack) and stroke    Mixed hyperlipidemia  -     Comprehensive Metabolic Panel, Fasting; Future  -     T4, Free; Future  -     TSH; Future  -     Lipid Panel; Future    Hyperglycemia  -     Hemoglobin A1C; Future         Recommendations for Preventive Services Due: see orders and patient instructions/AVS.  Recommended screening schedule for the next 5-10 years is provided to the patient in written form: see Patient Instructions/AVS.     Return for Medicare Annual Wellness Visit in 1 year. Reviewed and updated this visit by clinical staff:  Tobacco  Allergies  Meds  Med Hx  Surg Hx  Soc Hx  Fam Hx      Subjective   The following acute and/or chronic problems were also addressed today:    Patient's complete Health Risk Assessment and screening values have been reviewed and are found in Flowsheets. The following problems were reviewed today and where indicated follow up appointments were made and/or referrals ordered. Positive Risk Factor Screenings with Interventions:         Tobacco Use:     Tobacco Use: High Risk    Smoking Tobacco Use: Current Some Day Smoker    Smokeless Tobacco Use: Never Used     E-Cigarettes/Vaping Use     Questions Responses    E-Cigarette/Vaping Use     Start Date     Passive Exposure     Quit Date     Counseling Given     Comments         Substance Abuse - Tobacco Interventions:  tobacco cessation tips and resources provided, try to quit smoking    Alcohol Screening:  AUDIT Total Score: 3    A score of 8 or more is associated with harmful or hazardous drinking.  A score of 13 or more in women, and 15 or more in men, is likely to indicate alcohol dependence. Substance Abuse - Alcohol Interventions:  just ligh drink, one a glass at night          General Health and ACP:  General  In general, how would you say your health is?: Very Good  In the past 7 days, have you experienced any of the following: New or Increased Pain, New or Increased Fatigue, Loneliness, Social Isolation, Stress or Anger?: No  Do you get the social and emotional support that you need?: Yes  Do you have a Living Will?: Yes    Advance Directives     Power of [address] [place] [address] Will ACP-Advance Directive ACP-Power of     Not on File Not on File Not on File Not on File      General Health Risk Interventions:  · doing fine    Health Habits/Nutrition:     Physical Activity: Insufficiently Active    Days of Exercise per Week: 1 day    Minutes of Exercise per Session: 20 min     Have you lost any weight without trying in the past 3 months?: No    Body mass index: (!) 18.17    Have you seen the dentist within the past year?: Yes      Health Habits/Nutrition Interventions:  · dose f/u with the dentiest   · Still driving     Safety:  Do you have working smoke detectors?: Yes  Do you have any tripping hazards - loose or unsecured carpets or rugs?: (!) Yes  Do you have any tripping hazards - clutter in doorways, halls, or stairs?: (!) Yes  Do you have either shower bars, grab bars, non-slip mats or non-slip surfaces in your shower or bathtub?: Yes  Do all of your stairways have a railing or banister?: Yes  Do you always fasten your seatbelt when you are in a car?: Yes    Safety Interventions:  · doing fine   · Still driving              No Known Allergies  Prior to Visit Medications    Medication Sig Taking?  Authorizing Provider   metoprolol succinate (TOPROL XL) 25 MG extended release tablet  Yes Historical Provider, MD   calcium-vitamin D (OSCAL) 250-125 MG-UNIT per tablet Take 1 tablet by mouth daily Yes Historical Provider, MD   aspirin 81 MG chewable tablet Take 1 tablet by mouth daily Yes Josephine Ellington MD   atorvastatin (LIPITOR) 40 MG tablet Take 1 tablet by mouth nightly Yes Josephine Ellington MD   clopidogrel (PLAVIX) 75 MG tablet Take 1 tablet by mouth daily Yes Josephine Ellington MD       CareTeam (Including outside providers/suppliers regularly involved in providing care):   Patient Care Team:  Ml Rivera MD as PCP - General (Family Medicine)  Ml Rivera MD as PCP - REHABILITATION [facility] Empaneled Provider

## 2022-02-22 NOTE — PATIENT INSTRUCTIONS
Personalized Preventive Plan for Kristen Dial - 2/22/2022  Medicare offers a range of preventive health benefits. Some of the tests and screenings are paid in full while other may be subject to a deductible, co-insurance, and/or copay. Some of these benefits include a comprehensive review of your medical history including lifestyle, illnesses that may run in your family, and various assessments and screenings as appropriate. After reviewing your medical record and screening and assessments performed today your provider may have ordered immunizations, labs, imaging, and/or referrals for you. A list of these orders (if applicable) as well as your Preventive Care list are included within your After Visit Summary for your review. Other Preventive Recommendations:    · A preventive eye exam performed by an eye specialist is recommended every 1-2 years to screen for glaucoma; cataracts, macular degeneration, and other eye disorders. · A preventive dental visit is recommended every 6 months. · Try to get at least 150 minutes of exercise per week or 10,000 steps per day on a pedometer . · Order or download the FREE \"Exercise & Physical Activity: Your Everyday Guide\" from [employer]. Call 9-997.653.6808 or search [employer] online. · You need 6982-2778 mg of calcium and 8350-9369 IU of vitamin D per day. It is possible to meet your calcium requirement with diet alone, but a vitamin D supplement is usually necessary to meet this goal.  · When exposed to the sun, use a sunscreen that protects against both UVA and UVB radiation with an SPF of 30 or greater. Reapply every 2 to 3 hours or after sweating, drying off with a towel, or swimming. · Always wear a seat belt when traveling in a car. Always wear a helmet when riding a bicycle or motorcycle.

## 2022-02-23 LAB
ESTIMATED AVERAGE GLUCOSE: 122.6 MG/DL
HBA1C MFR BLD: 5.9 %

## 2022-08-23 ENCOUNTER — OFFICE VISIT (OUTPATIENT)
Dept: FAMILY MEDICINE CLINIC | Age: 67
End: 2022-08-23
Payer: COMMERCIAL

## 2022-08-23 VITALS
OXYGEN SATURATION: 98 % | BODY MASS INDEX: 18.43 KG/M2 | HEART RATE: 60 BPM | WEIGHT: 114.2 LBS | SYSTOLIC BLOOD PRESSURE: 128 MMHG | DIASTOLIC BLOOD PRESSURE: 78 MMHG

## 2022-08-23 DIAGNOSIS — Z86.73 H/O TIA (TRANSIENT ISCHEMIC ATTACK) AND STROKE: ICD-10-CM

## 2022-08-23 DIAGNOSIS — I10 PRIMARY HYPERTENSION: Primary | ICD-10-CM

## 2022-08-23 DIAGNOSIS — R23.0 BLUISH SKIN DISCOLORATION: ICD-10-CM

## 2022-08-23 DIAGNOSIS — F17.200 TOBACCO USE DISORDER: ICD-10-CM

## 2022-08-23 PROCEDURE — 1090F PRES/ABSN URINE INCON ASSESS: CPT | Performed by: FAMILY MEDICINE

## 2022-08-23 PROCEDURE — 4004F PT TOBACCO SCREEN RCVD TLK: CPT | Performed by: FAMILY MEDICINE

## 2022-08-23 PROCEDURE — G8419 CALC BMI OUT NRM PARAM NOF/U: HCPCS | Performed by: FAMILY MEDICINE

## 2022-08-23 PROCEDURE — G8427 DOCREV CUR MEDS BY ELIG CLIN: HCPCS | Performed by: FAMILY MEDICINE

## 2022-08-23 PROCEDURE — G8400 PT W/DXA NO RESULTS DOC: HCPCS | Performed by: FAMILY MEDICINE

## 2022-08-23 PROCEDURE — 3017F COLORECTAL CA SCREEN DOC REV: CPT | Performed by: FAMILY MEDICINE

## 2022-08-23 PROCEDURE — 99214 OFFICE O/P EST MOD 30 MIN: CPT | Performed by: FAMILY MEDICINE

## 2022-08-23 PROCEDURE — 1123F ACP DISCUSS/DSCN MKR DOCD: CPT | Performed by: FAMILY MEDICINE

## 2022-08-23 ASSESSMENT — ENCOUNTER SYMPTOMS
BACK PAIN: 0
CONSTIPATION: 0
CHEST TIGHTNESS: 0
SINUS PRESSURE: 0
WHEEZING: 0
SHORTNESS OF BREATH: 0
DIARRHEA: 0

## 2022-08-23 NOTE — PROGRESS NOTES
Allergies  Current Outpatient Medications   Medication Sig Dispense Refill    metoprolol succinate (TOPROL XL) 25 MG extended release tablet       calcium-vitamin D (OSCAL) 250-125 MG-UNIT per tablet Take 1 tablet by mouth daily      aspirin 81 MG chewable tablet Take 1 tablet by mouth daily 30 tablet 0    atorvastatin (LIPITOR) 40 MG tablet Take 1 tablet by mouth nightly 30 tablet 0    clopidogrel (PLAVIX) 75 MG tablet Take 1 tablet by mouth daily 20 tablet 0     No current facility-administered medications for this visit. Review of Systems   Constitutional:  Negative for activity change and appetite change. HENT:  Negative for postnasal drip and sinus pressure. Respiratory:  Negative for chest tightness, shortness of breath and wheezing. Cardiovascular:  Negative for leg swelling. Gastrointestinal:  Negative for constipation and diarrhea. Genitourinary:  Negative for dysuria and frequency. Musculoskeletal:  Negative for back pain and gait problem. Skin:         Small not discolored on the top of her right foot   Neurological:  Negative for dizziness. Psychiatric/Behavioral:  Negative for agitation, behavioral problems and sleep disturbance. The patient is not nervous/anxious.       Lab Results   Component Value Date    WBC 8.1 09/19/2021    HGB 14.1 09/19/2021    HCT 41.6 09/19/2021    MCV 91.2 09/19/2021     09/19/2021     Lab Results   Component Value Date     02/22/2022    K 4.1 02/22/2022     02/22/2022    CO2 25 02/22/2022    BUN 14 02/22/2022    CREATININE 0.7 02/22/2022    GLUCOSE 95 09/19/2021    CALCIUM 9.3 02/22/2022    PROT 7.4 02/22/2022    LABALBU 4.4 02/22/2022    BILITOT 0.5 02/22/2022    ALKPHOS 72 02/22/2022    AST 20 02/22/2022    ALT 18 02/22/2022    LABGLOM >60 02/22/2022    GFRAA >60 02/22/2022    AGRATIO 1.5 02/22/2022    GLOB 3.0 11/18/2020     Lab Results   Component Value Date    CHOL 138 02/22/2022    CHOL 174 11/18/2020    CHOL 187 04/02/2015 Lab Results   Component Value Date    TRIG 65 02/22/2022    TRIG 71 11/18/2020    TRIG 72 04/02/2015     Lab Results   Component Value Date    HDL 60 02/22/2022    HDL 67 (H) 11/18/2020    HDL 73 (A) 04/02/2015     Lab Results   Component Value Date    LDLCALC 65 02/22/2022    LDLCALC 93 11/18/2020    LDLCALC 110 04/02/2015     Lab Results   Component Value Date    LABA1C 5.9 02/22/2022     Lab Results   Component Value Date    TSH 1.29 02/22/2022         /78 (Site: Left Upper Arm, Position: Sitting, Cuff Size: Medium Adult)   Pulse 60   Wt 114 lb 3.2 oz (51.8 kg)   SpO2 98%   BMI 18.43 kg/m²     BP Readings from Last 3 Encounters:   08/23/22 128/78   02/22/22 124/78   11/19/21 (!) 150/82       Wt Readings from Last 3 Encounters:   08/23/22 114 lb 3.2 oz (51.8 kg)   02/22/22 112 lb 9.6 oz (51.1 kg)   11/19/21 111 lb 3.2 oz (50.4 kg)         Physical Exam  Constitutional:       General: She is not in acute distress. Appearance: Normal appearance. She is well-developed. She is not diaphoretic. HENT:      Head: Normocephalic and atraumatic. Eyes:      General: No scleral icterus. Pupils: Pupils are equal, round, and reactive to light. Cardiovascular:      Rate and Rhythm: Normal rate and regular rhythm. Heart sounds: Normal heart sounds. No murmur heard. Pulmonary:      Effort: Pulmonary effort is normal.      Breath sounds: Normal breath sounds. No wheezing. Musculoskeletal:         General: Normal range of motion. Cervical back: Normal range of motion and neck supple. No rigidity. Right lower leg: No edema. Left lower leg: No edema. Skin:     Comments: 2 x 2 discolored skin dark red on the dorsal surface of the right foot, no tenderness, no limitation of the movement   Neurological:      General: No focal deficit present. Mental Status: She is alert and oriented to person, place, and time.    Psychiatric:         Mood and Affect: Mood normal.         Behavior: Behavior normal.       ASSESSMENT/ PLAN:    1. Primary hypertension  -We will control it she is on metoprolol    2. H/O TIA (transient ischemic attack) and stroke  -No focal deficit she is on Plavix and Lipitor as a preventative    3. Tobacco use disorder  -encourage smoking cessation    4. Bluish skin discoloration  -Right foot status post injury, no tenderness no restriction of movement  Just discoloration from the injury, reassure the patient will go away            - All old blood work reviewed with the patient  - Appropriate prescription are addressed. - After visit summery provided. - Questions answered and patient verbalizes understanding.  - Call for any problem, questions, or concerns.  - RTC if symptoms worse. Return in about 6 months (around 2/23/2023).

## 2023-02-24 SDOH — ECONOMIC STABILITY: HOUSING INSECURITY
IN THE LAST 12 MONTHS, WAS THERE A TIME WHEN YOU DID NOT HAVE A STEADY PLACE TO SLEEP OR SLEPT IN A SHELTER (INCLUDING NOW)?: NO

## 2023-02-24 SDOH — ECONOMIC STABILITY: FOOD INSECURITY: WITHIN THE PAST 12 MONTHS, YOU WORRIED THAT YOUR FOOD WOULD RUN OUT BEFORE YOU GOT MONEY TO BUY MORE.: NEVER TRUE

## 2023-02-24 SDOH — HEALTH STABILITY: PHYSICAL HEALTH: ON AVERAGE, HOW MANY DAYS PER WEEK DO YOU ENGAGE IN MODERATE TO STRENUOUS EXERCISE (LIKE A BRISK WALK)?: 3 DAYS

## 2023-02-24 SDOH — ECONOMIC STABILITY: FOOD INSECURITY: WITHIN THE PAST 12 MONTHS, THE FOOD YOU BOUGHT JUST DIDN'T LAST AND YOU DIDN'T HAVE MONEY TO GET MORE.: NEVER TRUE

## 2023-02-24 SDOH — ECONOMIC STABILITY: TRANSPORTATION INSECURITY
IN THE PAST 12 MONTHS, HAS LACK OF TRANSPORTATION KEPT YOU FROM MEETINGS, WORK, OR FROM GETTING THINGS NEEDED FOR DAILY LIVING?: NO

## 2023-02-24 SDOH — HEALTH STABILITY: PHYSICAL HEALTH: ON AVERAGE, HOW MANY MINUTES DO YOU ENGAGE IN EXERCISE AT THIS LEVEL?: 20 MIN

## 2023-02-24 SDOH — ECONOMIC STABILITY: INCOME INSECURITY: HOW HARD IS IT FOR YOU TO PAY FOR THE VERY BASICS LIKE FOOD, HOUSING, MEDICAL CARE, AND HEATING?: NOT VERY HARD

## 2023-02-24 ASSESSMENT — LIFESTYLE VARIABLES
HOW MANY STANDARD DRINKS CONTAINING ALCOHOL DO YOU HAVE ON A TYPICAL DAY: 1
HOW OFTEN DO YOU HAVE SIX OR MORE DRINKS ON ONE OCCASION: 1
HOW MANY STANDARD DRINKS CONTAINING ALCOHOL DO YOU HAVE ON A TYPICAL DAY: 1 OR 2
HOW OFTEN DO YOU HAVE A DRINK CONTAINING ALCOHOL: 2-4 TIMES A MONTH
HOW OFTEN DO YOU HAVE A DRINK CONTAINING ALCOHOL: 3

## 2023-02-24 ASSESSMENT — PATIENT HEALTH QUESTIONNAIRE - PHQ9
SUM OF ALL RESPONSES TO PHQ9 QUESTIONS 1 & 2: 2
1. LITTLE INTEREST OR PLEASURE IN DOING THINGS: 0
SUM OF ALL RESPONSES TO PHQ QUESTIONS 1-9: 2
2. FEELING DOWN, DEPRESSED OR HOPELESS: 2
SUM OF ALL RESPONSES TO PHQ QUESTIONS 1-9: 2

## 2023-02-27 ENCOUNTER — OFFICE VISIT (OUTPATIENT)
Dept: FAMILY MEDICINE CLINIC | Age: 68
End: 2023-02-27
Payer: COMMERCIAL

## 2023-02-27 VITALS
HEIGHT: 66 IN | OXYGEN SATURATION: 97 % | SYSTOLIC BLOOD PRESSURE: 138 MMHG | DIASTOLIC BLOOD PRESSURE: 78 MMHG | BODY MASS INDEX: 18.06 KG/M2 | WEIGHT: 112.4 LBS | HEART RATE: 79 BPM

## 2023-02-27 DIAGNOSIS — Z00.00 MEDICARE ANNUAL WELLNESS VISIT, SUBSEQUENT: Primary | ICD-10-CM

## 2023-02-27 DIAGNOSIS — E78.2 MIXED HYPERLIPIDEMIA: ICD-10-CM

## 2023-02-27 DIAGNOSIS — R73.9 HYPERGLYCEMIA: ICD-10-CM

## 2023-02-27 DIAGNOSIS — Z12.31 ENCOUNTER FOR SCREENING MAMMOGRAM FOR BREAST CANCER: ICD-10-CM

## 2023-02-27 LAB
A/G RATIO: 1.3 (ref 1.1–2.2)
ALBUMIN SERPL-MCNC: 4.3 G/DL (ref 3.4–5)
ALP BLD-CCNC: 76 U/L (ref 40–129)
ALT SERPL-CCNC: 15 U/L (ref 10–40)
ANION GAP SERPL CALCULATED.3IONS-SCNC: 12 MMOL/L (ref 3–16)
AST SERPL-CCNC: 20 U/L (ref 15–37)
BILIRUB SERPL-MCNC: 0.5 MG/DL (ref 0–1)
BUN BLDV-MCNC: 13 MG/DL (ref 7–20)
CALCIUM SERPL-MCNC: 9.9 MG/DL (ref 8.3–10.6)
CHLORIDE BLD-SCNC: 103 MMOL/L (ref 99–110)
CHOLESTEROL, TOTAL: 185 MG/DL (ref 0–199)
CO2: 26 MMOL/L (ref 21–32)
CREAT SERPL-MCNC: 0.7 MG/DL (ref 0.6–1.2)
ESTIMATED AVERAGE GLUCOSE: 116.9 MG/DL
GFR SERPL CREATININE-BSD FRML MDRD: >60 ML/MIN/{1.73_M2}
GLUCOSE FASTING: 101 MG/DL (ref 70–99)
HBA1C MFR BLD: 5.7 %
HDLC SERPL-MCNC: 63 MG/DL (ref 40–60)
LDL CHOLESTEROL CALCULATED: 104 MG/DL
POTASSIUM SERPL-SCNC: 4.3 MMOL/L (ref 3.5–5.1)
SODIUM BLD-SCNC: 141 MMOL/L (ref 136–145)
TOTAL PROTEIN: 7.5 G/DL (ref 6.4–8.2)
TRIGL SERPL-MCNC: 91 MG/DL (ref 0–150)
VLDLC SERPL CALC-MCNC: 18 MG/DL

## 2023-02-27 PROCEDURE — 3017F COLORECTAL CA SCREEN DOC REV: CPT | Performed by: FAMILY MEDICINE

## 2023-02-27 PROCEDURE — 1123F ACP DISCUSS/DSCN MKR DOCD: CPT | Performed by: FAMILY MEDICINE

## 2023-02-27 PROCEDURE — 3075F SYST BP GE 130 - 139MM HG: CPT | Performed by: FAMILY MEDICINE

## 2023-02-27 PROCEDURE — G0439 PPPS, SUBSEQ VISIT: HCPCS | Performed by: FAMILY MEDICINE

## 2023-02-27 PROCEDURE — G8484 FLU IMMUNIZE NO ADMIN: HCPCS | Performed by: FAMILY MEDICINE

## 2023-02-27 PROCEDURE — 3078F DIAST BP <80 MM HG: CPT | Performed by: FAMILY MEDICINE

## 2023-02-27 NOTE — PROGRESS NOTES
doiMedicare Annual Wellness Visit    Chirag Skaggs is here for Medicare AWV    Assessment & Plan   Medicare annual wellness visit, subsequent  Mixed hyperlipidemia  -     Lipid Panel; Future  -     Comprehensive Metabolic Panel, Fasting; Future  Hyperglycemia  -     Hemoglobin A1C; Future  Encounter for screening mammogram for breast cancer  -     REEMA DIGITAL SCREEN W CAD BILATERAL PER PROTOCOL; Future    Recommendations for Preventive Services Due: see orders and patient instructions/AVS.  Recommended screening schedule for the next 5-10 years is provided to the patient in written form: see Patient Instructions/AVS.     Return for Medicare Annual Wellness Visit in 1 year. Subjective   The following acute and/or chronic problems were also addressed today:  Doing fine and has no complaints    Patient's complete Health Risk Assessment and screening values have been reviewed and are found in Flowsheets. The following problems were reviewed today and where indicated follow up appointments were made and/or referrals ordered. Positive Risk Factor Screenings with Interventions:                 Weight and Activity:  Physical Activity: Insufficiently Active    Days of Exercise per Week: 3 days    Minutes of Exercise per Session: 20 min     On average, how many days per week do you engage in moderate to strenuous exercise (like a brisk walk)?: 3 days  Have you lost any weight without trying in the past 3 months?: No  Body mass index: (!) 18.14  Underweight Interventions: Work on gain some wt        Vision Screen:  Do you have difficulty driving, watching TV, or doing any of your daily activities because of your eyesight?: No  Have you had an eye exam within the past year?: (!) No  No results found.     Interventions:   Patient encouraged to make appointment with their eye specialist    Safety:  Do you have either shower bars, grab bars, non-slip mats or non-slip surfaces in your shower or bathtub?: (!) No  Interventions:  Doing fine      Tobacco Use:  Tobacco Use: High Risk    Smoking Tobacco Use: Some Days    Smokeless Tobacco Use: Never    Passive Exposure: Not on file     E-cigarette/Vaping       Questions Responses    E-cigarette/Vaping Use     Start Date     Passive Exposure     Quit Date     Counseling Given     Comments         Interventions:  Doing fine                        Objective   Vitals:    02/27/23 0824   BP: 138/78   Site: Left Upper Arm   Position: Sitting   Cuff Size: Medium Adult   Pulse: 79   SpO2: 97%   Weight: 112 lb 6.4 oz (51 kg)   Height: 5' 6\" (1.676 m)      Body mass index is 18.14 kg/m². No Known Allergies  Prior to Visit Medications    Medication Sig Taking?  Authorizing Provider   metoprolol succinate (TOPROL XL) 25 MG extended release tablet  Yes Historical Provider, MD   calcium-vitamin D (OSCAL) 250-125 MG-UNIT per tablet Take 1 tablet by mouth daily Yes Historical Provider, MD   aspirin 81 MG chewable tablet Take 1 tablet by mouth daily Yes Omega Russell MD   atorvastatin (LIPITOR) 40 MG tablet Take 1 tablet by mouth nightly Yes Omega Russell MD   clopidogrel (PLAVIX) 75 MG tablet Take 1 tablet by mouth daily Yes Omega Russell MD       [facility] (Including outside providers/suppliers regularly involved in providing care):   Patient Care Team:  Yasmin Hernandez MD as PCP - General (Family Medicine)  Yasmin Hernandez MD as PCP - Empaneled Provider     Reviewed and updated this visit:  Tobacco  Allergies  Meds  Med Hx  Surg Hx  Soc Hx  Fam Hx             Yasmin Hernandez MD

## 2023-02-27 NOTE — PATIENT INSTRUCTIONS
Learning About Vision Tests  What are vision tests? The four most common vision tests are visual acuity tests, refraction, visual field tests, and color vision tests. Visual acuity (sharpness) tests  These tests are used: To see if you need glasses or contact lenses. To monitor an eye problem. To check an eye injury. Visual acuity tests are done as part of routine exams. You may also have this test when you get your 's license or apply for some types of jobs. Visual field tests  These tests are used: To check for vision loss in any area of your range of vision. To screen for certain eye diseases. To look for nerve damage after a stroke, head injury, or other problem that could reduce blood flow to the brain. Refraction and color tests  A refraction test is done to find the right prescription for glasses and contact lenses. A color vision test is done to check for color blindness. Color vision is often tested as part of a routine exam. You may also have this test when you apply for a job where recognizing different colors is important, such as , electronics, or the [employer]. How are vision tests done? Visual acuity test   You cover one eye at a time. You read aloud from a wall chart across the room. You read aloud from a small card that you hold in your hand. Refraction   You look into a special device. The device puts lenses of different strengths in front of each eye to see how strong your glasses or contact lenses need to be. Visual field tests   Your doctor may have you look through special machines. Or your doctor may simply have you stare straight ahead while they move a finger into and out of your field of vision. Color vision test   You look at pieces of printed test patterns in various colors. You say what number or symbol you see. Your doctor may have you trace the number or symbol using a pointer. How do these tests feel?   There is very little chance of having a problem from this test. If dilating drops are used for a vision test, they may make the eyes sting and cause a medicine taste in the mouth. Follow-up care is a key part of your treatment and safety. Be sure to make and go to all appointments, and call your doctor if you are having problems. It's also a good idea to know your test results and keep a list of the medicines you take. Where can you learn more? Go to http://www.emmanuel.com/ and enter G551 to learn more about \"Learning About Vision Tests. \"  Current as of: October 12, 2022               Content Version: 13.5  © 6239-4928 [employer]. Care instructions adapted under license by [facility] ([facility]). If you have questions about a medical condition or this instruction, always ask your healthcare professional. [address] any warranty or liability for your use of this information. Advance Directives: Care Instructions  Overview  An advance directive is a legal way to state your wishes at the end of your life. It tells your family and your doctor what to do if you can't say what you want. There are two main types of advance directives. You can change them any time your wishes change. Living will. This form tells your family and your doctor your wishes about life support and other treatment. The form is also called a declaration. Medical power of . This form lets you name a person to make treatment decisions for you when you can't speak for yourself. This person is called a health care agent (health care proxy, health care surrogate). The form is also called a durable power of  for health care. If you do not have an advance directive, decisions about your medical care may be made by a family member, or by a doctor or a  who doesn't know you. It may help to think of an advance directive as a gift to the people who care for you.  If you have one, they won't have to make tough decisions by themselves. For more information, including forms for your state, see the [address] website (www.caringinfo.org/planning/advance-directives/). Follow-up care is a key part of your treatment and safety. Be sure to make and go to all appointments, and call your doctor if you are having problems. It's also a good idea to know your test results and keep a list of the medicines you take. What should you include in an advance directive? Many states have a unique advance directive form. (It may ask you to address specific issues.) Or you might use a universal form that's approved by many states. If your form doesn't tell you what to address, it may be hard to know what to include in your advance directive. Use the questions below to help you get started. Who do you want to make decisions about your medical care if you are not able to? What life-support measures do you want if you have a serious illness that gets worse over time or can't be cured? What are you most afraid of that might happen? (Maybe you're afraid of having pain, losing your independence, or being kept alive by machines.)  Where would you prefer to die? (Your home? A hospital? A nursing home?)  Do you want to donate your organs when you die? Do you want certain Christianity practices performed before you die? When should you call for help? Be sure to contact your doctor if you have any questions. Where can you learn more? Go to http://www.emmanuel.com/ and enter R264 to learn more about \"Advance Directives: Care Instructions. \"  Current as of: June 16, 2022               Content Version: 13.5  © 5073-5265 [employer]. Care instructions adapted under license by [facility] ([facility]). If you have questions about a medical condition or this instruction, always ask your healthcare professional. [address] any warranty or liability for your use of this information.            A Healthy Heart: Care Instructions  Your Care Instructions     Coronary artery disease, also called heart disease, occurs when a substance called plaque builds up in the vessels that supply oxygen-rich blood to your heart muscle. This can narrow the blood vessels and reduce blood flow. A heart attack happens when blood flow is completely blocked. A high-fat diet, smoking, and other factors increase the risk of heart disease. Your doctor has found that you have a chance of having heart disease. You can do lots of things to keep your heart healthy. It may not be easy, but you can change your diet, exercise more, and quit smoking. These steps really work to lower your chance of heart disease. Follow-up care is a key part of your treatment and safety. Be sure to make and go to all appointments, and call your doctor if you are having problems. It's also a good idea to know your test results and keep a list of the medicines you take. How can you care for yourself at home? Diet    Use less salt when you cook and eat. This helps lower your blood pressure. Taste food before salting. Add only a little salt when you think you need it. With time, your taste buds will adjust to less salt.     Eat fewer snack items, fast foods, canned soups, and other high-salt, high-fat, processed foods.     Read food labels and try to avoid saturated and trans fats. They increase your risk of heart disease by raising cholesterol levels.     Limit the amount of solid fat-butter, margarine, and shortening-you eat. Use olive, peanut, or canola oil when you cook. Bake, broil, and steam foods instead of frying them.     Eat a variety of fruit and vegetables every day. Dark green, deep orange, red, or yellow fruits and vegetables are especially good for you. Examples include spinach, carrots, peaches, and berries.     Foods high in fiber can reduce your cholesterol and provide important vitamins and minerals.  High-fiber foods include whole-grain cereals and breads, oatmeal, beans, brown rice, citrus fruits, and apples.     Eat lean proteins. Heart-healthy proteins include seafood, lean meats and poultry, eggs, beans, peas, nuts, seeds, and soy products.     Limit drinks and foods with added sugar. These include candy, desserts, and soda pop.   Lifestyle changes    If your doctor recommends it, get more exercise. Walking is a good choice. Bit by bit, increase the amount you walk every day. Try for at least 30 minutes on most days of the week. You also may want to swim, bike, or do other activities.     Do not smoke. If you need help quitting, talk to your doctor about stop-smoking programs and medicines. These can increase your chances of quitting for good. Quitting smoking may be the most important step you can take to protect your heart. It is never too late to quit.     Limit alcohol to 2 drinks a day for men and 1 drink a day for women. Too much alcohol can cause health problems.     Manage other health problems such as diabetes, high blood pressure, and high cholesterol. If you think you may have a problem with alcohol or drug use, talk to your doctor.   Medicines    Take your medicines exactly as prescribed. Call your doctor if you think you are having a problem with your medicine.     If your doctor recommends aspirin, take the amount directed each day. Make sure you take aspirin and not another kind of pain reliever, such as acetaminophen (Tylenol).   When should you call for help?   Call 911 if you have symptoms of a heart attack. These may include:    Chest pain or pressure, or a strange feeling in the chest.     Sweating.     Shortness of breath.     Pain, pressure, or a strange feeling in the back, neck, jaw, or upper belly or in one or both shoulders or arms.     Lightheadedness or sudden weakness.     A fast or irregular heartbeat.   After you call 911, the  may tell you to chew 1 adult-strength or 2 to 4 low-dose aspirin. Wait for an ambulance. Do not try  to drive yourself. Watch closely for changes in your health, and be sure to contact your doctor if you have any problems. Where can you learn more? Go to http://www.emmanuel.com/ and enter F075 to learn more about \"A Healthy Heart: Care Instructions. \"  Current as of: September 7, 2022               Content Version: 13.5  © 2006-2022 [employer]. Care instructions adapted under license by [facility] ([facility]). If you have questions about a medical condition or this instruction, always ask your healthcare professional. [address] any warranty or liability for your use of this information. Personalized Preventive Plan for Andrew Fermin - 2/27/2023  Medicare offers a range of preventive health benefits. Some of the tests and screenings are paid in full while other may be subject to a deductible, co-insurance, and/or copay. Some of these benefits include a comprehensive review of your medical history including lifestyle, illnesses that may run in your family, and various assessments and screenings as appropriate. After reviewing your medical record and screening and assessments performed today your provider may have ordered immunizations, labs, imaging, and/or referrals for you. A list of these orders (if applicable) as well as your Preventive Care list are included within your After Visit Summary for your review. Other Preventive Recommendations:    A preventive eye exam performed by an eye specialist is recommended every 1-2 years to screen for glaucoma; cataracts, macular degeneration, and other eye disorders. A preventive dental visit is recommended every 6 months. Try to get at least 150 minutes of exercise per week or 10,000 steps per day on a pedometer . Order or download the FREE \"Exercise & Physical Activity: Your Everyday Guide\" from [employer] [facility] [employer]. Call 7-758.973.4941 or search [employer] [facility] [employer] online.   You need 9921-0147 mg of calcium and 7646-9548 IU of vitamin D per day. It is possible to meet your calcium requirement with diet alone, but a vitamin D supplement is usually necessary to meet this goal.  When exposed to the sun, use a sunscreen that protects against both UVA and UVB radiation with an SPF of 30 or greater. Reapply every 2 to 3 hours or after sweating, drying off with a towel, or swimming. Always wear a seat belt when traveling in a car. Always wear a helmet when riding a bicycle or motorcycle.

## 2023-08-21 SDOH — HEALTH STABILITY: PHYSICAL HEALTH: ON AVERAGE, HOW MANY DAYS PER WEEK DO YOU ENGAGE IN MODERATE TO STRENUOUS EXERCISE (LIKE A BRISK WALK)?: 4 DAYS

## 2023-08-24 ENCOUNTER — OFFICE VISIT (OUTPATIENT)
Dept: FAMILY MEDICINE CLINIC | Age: 68
End: 2023-08-24
Payer: COMMERCIAL

## 2023-08-24 VITALS
BODY MASS INDEX: 18.93 KG/M2 | HEART RATE: 83 BPM | OXYGEN SATURATION: 97 % | DIASTOLIC BLOOD PRESSURE: 72 MMHG | RESPIRATION RATE: 16 BRPM | WEIGHT: 110.9 LBS | SYSTOLIC BLOOD PRESSURE: 138 MMHG | HEIGHT: 64 IN

## 2023-08-24 DIAGNOSIS — Z00.00 ENCOUNTER FOR MEDICAL EXAMINATION TO ESTABLISH CARE: Primary | ICD-10-CM

## 2023-08-24 DIAGNOSIS — R73.03 PRE-DIABETES: ICD-10-CM

## 2023-08-24 DIAGNOSIS — M85.88 OSTEOPENIA OF LUMBAR SPINE: ICD-10-CM

## 2023-08-24 DIAGNOSIS — Z86.73 H/O TIA (TRANSIENT ISCHEMIC ATTACK) AND STROKE: ICD-10-CM

## 2023-08-24 DIAGNOSIS — Z12.31 ENCOUNTER FOR SCREENING MAMMOGRAM FOR MALIGNANT NEOPLASM OF BREAST: ICD-10-CM

## 2023-08-24 DIAGNOSIS — I10 PRIMARY HYPERTENSION: ICD-10-CM

## 2023-08-24 DIAGNOSIS — I63.9 STROKE DETERMINED BY CLINICAL ASSESSMENT (HCC): ICD-10-CM

## 2023-08-24 PROBLEM — N30.00 ACUTE CYSTITIS WITHOUT HEMATURIA: Status: RESOLVED | Noted: 2021-11-21 | Resolved: 2023-08-24

## 2023-08-24 PROBLEM — S72.001A CLOSED DISPLACED FRACTURE OF RIGHT FEMORAL NECK (HCC): Status: RESOLVED | Noted: 2018-03-20 | Resolved: 2023-08-24

## 2023-08-24 PROCEDURE — G8400 PT W/DXA NO RESULTS DOC: HCPCS

## 2023-08-24 PROCEDURE — 3017F COLORECTAL CA SCREEN DOC REV: CPT

## 2023-08-24 PROCEDURE — 3075F SYST BP GE 130 - 139MM HG: CPT

## 2023-08-24 PROCEDURE — 3078F DIAST BP <80 MM HG: CPT

## 2023-08-24 PROCEDURE — 1123F ACP DISCUSS/DSCN MKR DOCD: CPT

## 2023-08-24 PROCEDURE — 99214 OFFICE O/P EST MOD 30 MIN: CPT

## 2023-08-24 PROCEDURE — 1090F PRES/ABSN URINE INCON ASSESS: CPT

## 2023-08-24 PROCEDURE — G8427 DOCREV CUR MEDS BY ELIG CLIN: HCPCS

## 2023-08-24 PROCEDURE — G8420 CALC BMI NORM PARAMETERS: HCPCS

## 2023-08-24 PROCEDURE — 4004F PT TOBACCO SCREEN RCVD TLK: CPT

## 2023-08-24 ASSESSMENT — ENCOUNTER SYMPTOMS
BLOOD IN STOOL: 0
COLOR CHANGE: 0
SHORTNESS OF BREATH: 0
CONSTIPATION: 0
DIARRHEA: 0
VOMITING: 0
WHEEZING: 0
ABDOMINAL PAIN: 0
ABDOMINAL DISTENTION: 0
NAUSEA: 0

## 2023-08-24 NOTE — PROGRESS NOTES
2023    Court Kid    Chief Complaint   Patient presents with    Established New Doctor     No complaints. HPI  History was obtained from patient. TODD is a pleasant 80 y.o. female who presents today to establish care. Former patient of Dr. Dariela Gregory. . Her daughter is 35 and lives in [place] her  is in the [employer]. She is a  in the ED unit in [address] [place] [address]. She follows with Dr. Kendall Moreno at [facility]. PMH: ASD, cerebrovascular disease, HTN, stroke based on clinical assessment, closed displaced fracture of right femoral neck, hypokalemia, mixed HLD, tobacco user, osteopenia. Surgical: Right hip hemiarthroplasty    Family: Cancer, heart disease, dementia. Social:  Smoker/Nicotine use: Some day cigarette smoker for 15 years- 0.25 PPD. Pack years 3.75  Alcohol use: Yes, 1 glass of wine daily  Recreational drug use: No      Care Gaps:  Colon cancer screenin2021- Dr. Bradley De Leon- repeat in 5 years  Mammogram Screenin2022  Dexa: 2016- osteopenia    1. Encounter for medical examination to establish care    2. Primary hypertension    3. Pre-diabetes    4. Osteopenia of lumbar spine    5. Stroke determined by clinical assessment ([address])    6. H/O TIA (transient ischemic attack) and stroke    7. Encounter for screening mammogram for malignant neoplasm of breast           REVIEW OF SYMPTOMS    Review of Systems   Constitutional:  Negative for activity change, appetite change, chills, fever and unexpected weight change. Respiratory:  Negative for shortness of breath and wheezing. Cardiovascular:  Negative for chest pain and palpitations. Gastrointestinal:  Negative for abdominal distention, abdominal pain, blood in stool, constipation, diarrhea, nausea and vomiting. Skin:  Negative for color change. Neurological:  Negative for syncope and headaches. Psychiatric/Behavioral:  Positive for sleep disturbance (chronic).       PAST MEDICAL HISTORY  Past

## 2023-08-24 NOTE — PATIENT INSTRUCTIONS
Central Scheduling Phone Number [phone] to schedule Bone density and mammogram      10-12 hours fasting for lab work (water and black coffee only prior to lab work)    [facility] [address]. [address] [place] [address], [address] [facility]    Hours  Monday- Friday     8am-4pm  **Closed for lunch from 12:30-1pm    [address] [place] [address] Lab Hours  [address]    [facility], [address] [place] [address]    Hours  Monday-Friday 7am-5pm  Saturday  8am-12pm

## 2023-09-27 ENCOUNTER — HOSPITAL ENCOUNTER (OUTPATIENT)
Dept: WOMENS IMAGING | Age: 68
Discharge: HOME | End: 2023-09-27
Payer: COMMERCIAL

## 2023-09-27 ENCOUNTER — HOSPITAL ENCOUNTER (OUTPATIENT)
Age: 68
Discharge: HOME | End: 2023-09-27
Payer: COMMERCIAL

## 2023-09-27 DIAGNOSIS — Z12.31 ENCOUNTER FOR SCREENING MAMMOGRAM FOR MALIGNANT NEOPLASM OF BREAST: ICD-10-CM

## 2023-09-27 DIAGNOSIS — I10 PRIMARY HYPERTENSION: ICD-10-CM

## 2023-09-27 DIAGNOSIS — M85.88 OSTEOPENIA OF LUMBAR SPINE: ICD-10-CM

## 2023-09-27 DIAGNOSIS — R73.03 PRE-DIABETES: ICD-10-CM

## 2023-09-27 LAB
ANION GAP SERPL CALCULATED.3IONS-SCNC: 14 MMOL/L (ref 4–16)
BASOPHILS ABSOLUTE: 0.1 K/CU MM
BASOPHILS RELATIVE PERCENT: 0.6 % (ref 0–1)
BUN SERPL-MCNC: 16 MG/DL (ref 6–23)
CALCIUM SERPL-MCNC: 10 MG/DL (ref 8.3–10.6)
CHLORIDE BLD-SCNC: 103 MMOL/L (ref 99–110)
CO2: 25 MMOL/L (ref 21–32)
CREAT SERPL-MCNC: 0.2 MG/DL (ref 0.6–1.1)
DIFFERENTIAL TYPE: ABNORMAL
EOSINOPHILS ABSOLUTE: 0.2 K/CU MM
EOSINOPHILS RELATIVE PERCENT: 1.9 % (ref 0–3)
ESTIMATED AVERAGE GLUCOSE: 117 MG/DL
GFR SERPL CREATININE-BSD FRML MDRD: >60 ML/MIN/1.73M2
GLUCOSE SERPL-MCNC: 109 MG/DL (ref 70–99)
HBA1C MFR BLD: 5.7 % (ref 4.2–6.3)
HCT VFR BLD CALC: 45.6 % (ref 37–47)
HEMOGLOBIN: 14.4 GM/DL (ref 12.5–16)
IMMATURE NEUTROPHIL %: 0.3 % (ref 0–0.43)
LYMPHOCYTES ABSOLUTE: 2.3 K/CU MM
LYMPHOCYTES RELATIVE PERCENT: 29.8 % (ref 24–44)
MCH RBC QN AUTO: 29.3 PG (ref 27–31)
MCHC RBC AUTO-ENTMCNC: 31.6 % (ref 32–36)
MCV RBC AUTO: 92.7 FL (ref 78–100)
MONOCYTES ABSOLUTE: 0.5 K/CU MM
MONOCYTES RELATIVE PERCENT: 6.4 % (ref 0–4)
NUCLEATED RBC %: 0 %
PDW BLD-RTO: 12.8 % (ref 11.7–14.9)
PLATELET # BLD: 228 K/CU MM (ref 140–440)
PMV BLD AUTO: 11.4 FL (ref 7.5–11.1)
POTASSIUM SERPL-SCNC: 4.3 MMOL/L (ref 3.5–5.1)
RBC # BLD: 4.92 M/CU MM (ref 4.2–5.4)
SEGMENTED NEUTROPHILS ABSOLUTE COUNT: 4.8 K/CU MM
SEGMENTED NEUTROPHILS RELATIVE PERCENT: 61 % (ref 36–66)
SODIUM BLD-SCNC: 142 MMOL/L (ref 135–145)
TOTAL IMMATURE NEUTOROPHIL: 0.02 K/CU MM
TOTAL NUCLEATED RBC: 0 K/CU MM
TSH SERPL DL<=0.005 MIU/L-ACNC: 0.99 UIU/ML (ref 0.27–4.2)
WBC # BLD: 7.9 K/CU MM (ref 4–10.5)

## 2023-09-27 PROCEDURE — 80048 BASIC METABOLIC PNL TOTAL CA: CPT

## 2023-09-27 PROCEDURE — 77080 DXA BONE DENSITY AXIAL: CPT

## 2023-09-27 PROCEDURE — 77063 BREAST TOMOSYNTHESIS BI: CPT

## 2023-09-27 PROCEDURE — 83036 HEMOGLOBIN GLYCOSYLATED A1C: CPT

## 2023-09-27 PROCEDURE — 36415 COLL VENOUS BLD VENIPUNCTURE: CPT

## 2023-09-27 PROCEDURE — 84443 ASSAY THYROID STIM HORMONE: CPT

## 2023-09-27 PROCEDURE — 85025 COMPLETE CBC W/AUTO DIFF WBC: CPT

## 2024-08-16 SDOH — HEALTH STABILITY: PHYSICAL HEALTH: ON AVERAGE, HOW MANY DAYS PER WEEK DO YOU ENGAGE IN MODERATE TO STRENUOUS EXERCISE (LIKE A BRISK WALK)?: 3 DAYS

## 2024-08-16 SDOH — HEALTH STABILITY: PHYSICAL HEALTH: ON AVERAGE, HOW MANY MINUTES DO YOU ENGAGE IN EXERCISE AT THIS LEVEL?: 30 MIN

## 2024-08-16 ASSESSMENT — LIFESTYLE VARIABLES
HOW OFTEN DO YOU HAVE SIX OR MORE DRINKS ON ONE OCCASION: 1
HOW MANY STANDARD DRINKS CONTAINING ALCOHOL DO YOU HAVE ON A TYPICAL DAY: 1
HOW OFTEN DO YOU HAVE A DRINK CONTAINING ALCOHOL: 2-3 TIMES A WEEK
HOW OFTEN DO YOU HAVE A DRINK CONTAINING ALCOHOL: 4
HOW MANY STANDARD DRINKS CONTAINING ALCOHOL DO YOU HAVE ON A TYPICAL DAY: 1 OR 2

## 2024-08-16 ASSESSMENT — PATIENT HEALTH QUESTIONNAIRE - PHQ9
SUM OF ALL RESPONSES TO PHQ QUESTIONS 1-9: 0
1. LITTLE INTEREST OR PLEASURE IN DOING THINGS: NOT AT ALL
SUM OF ALL RESPONSES TO PHQ9 QUESTIONS 1 & 2: 0
2. FEELING DOWN, DEPRESSED OR HOPELESS: NOT AT ALL
SUM OF ALL RESPONSES TO PHQ QUESTIONS 1-9: 0

## 2024-08-23 ENCOUNTER — HOSPITAL ENCOUNTER (OUTPATIENT)
Age: 69
Discharge: HOME | End: 2024-08-23
Payer: MEDICARE

## 2024-08-23 LAB — TSH SERPL DL<=0.005 MIU/L-ACNC: 1.36 UIU/ML (ref 0.27–4.2)

## 2024-08-23 PROCEDURE — 84443 ASSAY THYROID STIM HORMONE: CPT

## 2024-08-23 PROCEDURE — 36415 COLL VENOUS BLD VENIPUNCTURE: CPT

## 2024-08-24 SDOH — ECONOMIC STABILITY: FOOD INSECURITY: WITHIN THE PAST 12 MONTHS, YOU WORRIED THAT YOUR FOOD WOULD RUN OUT BEFORE YOU GOT MONEY TO BUY MORE.: NEVER TRUE

## 2024-08-24 SDOH — ECONOMIC STABILITY: FOOD INSECURITY: WITHIN THE PAST 12 MONTHS, THE FOOD YOU BOUGHT JUST DIDN'T LAST AND YOU DIDN'T HAVE MONEY TO GET MORE.: NEVER TRUE

## 2024-08-24 SDOH — ECONOMIC STABILITY: INCOME INSECURITY: HOW HARD IS IT FOR YOU TO PAY FOR THE VERY BASICS LIKE FOOD, HOUSING, MEDICAL CARE, AND HEATING?: NOT VERY HARD

## 2024-08-26 ENCOUNTER — OFFICE VISIT (OUTPATIENT)
Dept: FAMILY MEDICINE CLINIC | Age: 69
End: 2024-08-26

## 2024-08-26 ENCOUNTER — OFFICE VISIT (OUTPATIENT)
Dept: FAMILY MEDICINE CLINIC | Age: 69
End: 2024-08-26
Payer: MEDICARE

## 2024-08-26 VITALS
SYSTOLIC BLOOD PRESSURE: 130 MMHG | OXYGEN SATURATION: 98 % | BODY MASS INDEX: 17.93 KG/M2 | HEART RATE: 62 BPM | WEIGHT: 105 LBS | HEIGHT: 64 IN | DIASTOLIC BLOOD PRESSURE: 86 MMHG

## 2024-08-26 VITALS
OXYGEN SATURATION: 98 % | SYSTOLIC BLOOD PRESSURE: 130 MMHG | HEIGHT: 64 IN | HEART RATE: 62 BPM | DIASTOLIC BLOOD PRESSURE: 86 MMHG | BODY MASS INDEX: 17.93 KG/M2 | WEIGHT: 105 LBS

## 2024-08-26 DIAGNOSIS — Z00.00 MEDICARE ANNUAL WELLNESS VISIT, SUBSEQUENT: Primary | ICD-10-CM

## 2024-08-26 DIAGNOSIS — E78.2 MIXED HYPERLIPIDEMIA: ICD-10-CM

## 2024-08-26 DIAGNOSIS — R73.03 PRE-DIABETES: ICD-10-CM

## 2024-08-26 DIAGNOSIS — Z86.73 H/O TIA (TRANSIENT ISCHEMIC ATTACK) AND STROKE: ICD-10-CM

## 2024-08-26 DIAGNOSIS — I10 PRIMARY HYPERTENSION: ICD-10-CM

## 2024-08-26 DIAGNOSIS — Z12.31 ENCOUNTER FOR SCREENING MAMMOGRAM FOR MALIGNANT NEOPLASM OF BREAST: ICD-10-CM

## 2024-08-26 PROBLEM — I51.7 LEFT VENTRICULAR HYPERTROPHY: Status: ACTIVE | Noted: 2024-08-26

## 2024-08-26 PROBLEM — I51.89 DIASTOLIC DYSFUNCTION: Status: ACTIVE | Noted: 2024-08-26

## 2024-08-26 PROBLEM — I77.9 DISORDER OF CAROTID ARTERY (HCC): Status: ACTIVE | Noted: 2024-08-26

## 2024-08-26 PROBLEM — I34.0 MITRAL VALVE REGURGITATION: Status: ACTIVE | Noted: 2024-08-26

## 2024-08-26 PROBLEM — I35.0 AORTIC VALVE STENOSIS: Status: ACTIVE | Noted: 2019-07-05

## 2024-08-26 PROCEDURE — 3075F SYST BP GE 130 - 139MM HG: CPT

## 2024-08-26 PROCEDURE — G0439 PPPS, SUBSEQ VISIT: HCPCS

## 2024-08-26 PROCEDURE — 1123F ACP DISCUSS/DSCN MKR DOCD: CPT

## 2024-08-26 PROCEDURE — 99214 OFFICE O/P EST MOD 30 MIN: CPT

## 2024-08-26 PROCEDURE — 3079F DIAST BP 80-89 MM HG: CPT

## 2024-08-26 ASSESSMENT — ENCOUNTER SYMPTOMS
BLOOD IN STOOL: 0
ABDOMINAL PAIN: 0
VOMITING: 0
COLOR CHANGE: 0
WHEEZING: 0
CONSTIPATION: 0
DIARRHEA: 0
NAUSEA: 0
ABDOMINAL DISTENTION: 0
SHORTNESS OF BREATH: 0

## 2024-08-26 ASSESSMENT — PATIENT HEALTH QUESTIONNAIRE - PHQ9
SUM OF ALL RESPONSES TO PHQ QUESTIONS 1-9: 0
SUM OF ALL RESPONSES TO PHQ QUESTIONS 1-9: 0
1. LITTLE INTEREST OR PLEASURE IN DOING THINGS: NOT AT ALL
SUM OF ALL RESPONSES TO PHQ QUESTIONS 1-9: 0
2. FEELING DOWN, DEPRESSED OR HOPELESS: NOT AT ALL
SUM OF ALL RESPONSES TO PHQ9 QUESTIONS 1 & 2: 0
SUM OF ALL RESPONSES TO PHQ QUESTIONS 1-9: 0

## 2024-08-26 NOTE — PATIENT INSTRUCTIONS
30 or greater. Reapply every 2 to 3 hours or after sweating, drying off with a towel, or swimming.  Always wear a seat belt when traveling in a car. Always wear a helmet when riding a bicycle or motorcycle.

## 2024-08-26 NOTE — PROGRESS NOTES
8/26/2024    Kailey Rowland    Chief Complaint   Patient presents with    Annual Exam     Monitor existing conditions and meds       HPI  History was obtained from patient.  Kailey is a pleasant 69 y.o. female who presents today for annual exam.     Follows with [facility]- Echo and stress test in June showed largely stable readings. Moderate aortic stenosis. She is compliant with ASA, statin and plavix.     BP well controlled on current regimen.     Care Gaps:  Will be due for mammogram next month.     1. Medicare annual wellness visit, subsequent    2. H/O TIA (transient ischemic attack) and stroke    3. Primary hypertension    4. Pre-diabetes    5. Mixed hyperlipidemia    6. Encounter for screening mammogram for malignant neoplasm of breast         REVIEW OF SYMPTOMS    Review of Systems   Constitutional:  Negative for activity change, appetite change, chills, fever and unexpected weight change.   Respiratory:  Negative for shortness of breath and wheezing.    Cardiovascular:  Negative for chest pain and palpitations.   Gastrointestinal:  Negative for abdominal distention, abdominal pain, blood in stool, constipation, diarrhea, nausea and vomiting.   Musculoskeletal:  Positive for arthralgias (right hip- intermittent).   Skin:  Negative for color change.   Neurological:  Negative for syncope and headaches.   Psychiatric/Behavioral:  Positive for sleep disturbance (chronic- juet a few hours will do).        PHQ-9 Total Score: 0 (8/26/2024  9:22 AM)      The ASCVD Risk score (Kate SANCHEZ, et al., 2019) failed to calculate for the following reasons:    The patient has a prior MI or stroke diagnosis    Last Weight Metrics:      8/26/2024     9:19 AM 8/26/2024     8:56 AM 8/24/2023    12:11 PM 2/27/2023     8:24 AM 8/23/2022     8:11 AM 2/22/2022     8:17 AM 11/19/2021    11:10 AM   Weight Loss Metrics   Height 5' 4.25\" 5' 4.25\" 5' 4.25\" 5' 6\"  5' 6\" 5' 6\"   Weight - Scale 105 lbs 105 lbs 110 lbs 14 oz 112 lbs 6

## 2024-08-26 NOTE — PROGRESS NOTES
Medicare Annual Wellness Visit    Kailey Rowland is here for Annual Exam (Monitor existing conditions and meds)    Assessment & Plan   Medicare annual wellness visit, subsequent  Primary hypertension  Pre-diabetes  Mixed hyperlipidemia  Encounter for screening mammogram for malignant neoplasm of breast    Chronic diagnoses addressed on separate note. Lower body weight is chronic issue, appetite remains good and access to food is not an issue. She has DHPOA. Has vision appointment scheduled.     Recommendations for Preventive Services Due: see orders and patient instructions/AVS.  Recommended screening schedule for the next 5-10 years is provided to the patient in written form: see Patient Instructions/AVS.     Return in about 1 year (around 2025).     Subjective       Patient's complete Health Risk Assessment and screening values have been reviewed and are found in Flowsheets. The following problems were reviewed today and where indicated follow up appointments were made and/or referrals ordered.    Positive Risk Factor Screenings with Interventions:                  Abnormal BMI (underweight):  Body mass index is 17.88 kg/m². (!) Abnormal  Interventions:  Patient comments: more active related weather, no issues with access to food. Stress levels have reduced due to alf.       Vision Screen:        Interventions:   Patient comments: No concerns. Has an upcoming vision appointment.     Safety:     Interventions:  Patient declined any further interventions or treatment      Tobacco Use:    Tobacco Use      Smoking status: Some Days        Packs/day: 0.00        Years: 0.3 packs/day for 15.0 years (3.8 ttl pk-yrs)        Types: Cigarettes        Start date: 3/20/2003        Last attempt to quit: 3/20/2018        Years since quittin.4      Smokeless tobacco: Never      Tobacco comments: Have quit, then started, then quit, and so on.     Interventions:  Patient declined any further intervention or

## 2025-07-10 ENCOUNTER — HOSPITAL ENCOUNTER (OUTPATIENT)
Dept: WOMENS IMAGING | Age: 70
Discharge: HOME | End: 2025-07-10
Payer: MEDICARE

## 2025-07-10 ENCOUNTER — HOSPITAL ENCOUNTER (OUTPATIENT)
Age: 70
Discharge: HOME | End: 2025-07-10
Payer: MEDICARE

## 2025-07-10 VITALS — WEIGHT: 122 LBS | BODY MASS INDEX: 19.61 KG/M2 | HEIGHT: 66 IN

## 2025-07-10 DIAGNOSIS — R73.03 PRE-DIABETES: ICD-10-CM

## 2025-07-10 DIAGNOSIS — E78.2 MIXED HYPERLIPIDEMIA: ICD-10-CM

## 2025-07-10 DIAGNOSIS — I10 PRIMARY HYPERTENSION: ICD-10-CM

## 2025-07-10 DIAGNOSIS — Z12.31 ENCOUNTER FOR SCREENING MAMMOGRAM FOR MALIGNANT NEOPLASM OF BREAST: ICD-10-CM

## 2025-07-10 LAB
ALBUMIN SERPL-MCNC: 4.2 G/DL (ref 3.4–5)
ALBUMIN/GLOB SERPL: 1.3 {RATIO} (ref 1.1–2.2)
ALP SERPL-CCNC: 82 U/L (ref 40–129)
ALT SERPL-CCNC: 16 U/L (ref 10–40)
ANION GAP SERPL CALCULATED.3IONS-SCNC: 11 MMOL/L (ref 9–17)
AST SERPL-CCNC: 24 U/L (ref 15–37)
BASOPHILS # BLD: 0.05 K/UL
BASOPHILS NFR BLD: 1 % (ref 0–1)
BILIRUB SERPL-MCNC: 0.8 MG/DL (ref 0–1)
BUN SERPL-MCNC: 11 MG/DL (ref 7–20)
CALCIUM SERPL-MCNC: 9.7 MG/DL (ref 8.3–10.6)
CHLORIDE SERPL-SCNC: 104 MMOL/L (ref 99–110)
CHOLEST SERPL-MCNC: 126 MG/DL (ref 125–199)
CO2 SERPL-SCNC: 24 MMOL/L (ref 21–32)
CREAT SERPL-MCNC: 0.7 MG/DL (ref 0.6–1.2)
EOSINOPHIL # BLD: 0.23 K/UL
EOSINOPHILS RELATIVE PERCENT: 3 % (ref 0–3)
ERYTHROCYTE [DISTWIDTH] IN BLOOD BY AUTOMATED COUNT: 13 % (ref 11.7–14.9)
EST. AVERAGE GLUCOSE BLD GHB EST-MCNC: 120 MG/DL
GFR, ESTIMATED: 77 ML/MIN/1.73M2
GLUCOSE SERPL-MCNC: 98 MG/DL (ref 74–99)
HBA1C MFR BLD: 5.8 % (ref 4.2–6.3)
HCT VFR BLD AUTO: 41.5 % (ref 37–47)
HDLC SERPL-MCNC: 56 MG/DL
HGB BLD-MCNC: 13.6 G/DL (ref 12.5–16)
IMM GRANULOCYTES # BLD AUTO: 0.02 K/UL
IMM GRANULOCYTES NFR BLD: 0 %
LDLC SERPL CALC-MCNC: 54 MG/DL
LYMPHOCYTES NFR BLD: 2.83 K/UL
LYMPHOCYTES RELATIVE PERCENT: 38 % (ref 24–44)
MCH RBC QN AUTO: 29.5 PG (ref 27–31)
MCHC RBC AUTO-ENTMCNC: 32.8 G/DL (ref 32–36)
MCV RBC AUTO: 90 FL (ref 78–100)
MONOCYTES NFR BLD: 0.46 K/UL
MONOCYTES NFR BLD: 6 % (ref 0–5)
NEUTROPHILS NFR BLD: 52 % (ref 36–66)
NEUTS SEG NFR BLD: 3.83 K/UL
PLATELET # BLD AUTO: 226 K/UL (ref 140–440)
PMV BLD AUTO: 11.3 FL (ref 7.5–11.1)
POTASSIUM SERPL-SCNC: 4 MMOL/L (ref 3.5–5.1)
PROT SERPL-MCNC: 7.4 G/DL (ref 6.4–8.2)
RBC # BLD AUTO: 4.61 M/UL (ref 4.2–5.4)
SODIUM SERPL-SCNC: 139 MMOL/L (ref 136–145)
TRIGL SERPL-MCNC: 76 MG/DL
WBC OTHER # BLD: 7.4 K/UL (ref 4–10.5)

## 2025-07-10 PROCEDURE — 77063 BREAST TOMOSYNTHESIS BI: CPT

## 2025-07-10 PROCEDURE — 85025 COMPLETE CBC W/AUTO DIFF WBC: CPT

## 2025-07-10 PROCEDURE — 80053 COMPREHEN METABOLIC PANEL: CPT

## 2025-07-10 PROCEDURE — 83036 HEMOGLOBIN GLYCOSYLATED A1C: CPT

## 2025-07-10 PROCEDURE — 80061 LIPID PANEL: CPT

## 2025-08-26 SDOH — ECONOMIC STABILITY: FOOD INSECURITY: WITHIN THE PAST 12 MONTHS, YOU WORRIED THAT YOUR FOOD WOULD RUN OUT BEFORE YOU GOT MONEY TO BUY MORE.: NEVER TRUE

## 2025-08-26 SDOH — ECONOMIC STABILITY: INCOME INSECURITY: IN THE LAST 12 MONTHS, WAS THERE A TIME WHEN YOU WERE NOT ABLE TO PAY THE MORTGAGE OR RENT ON TIME?: NO

## 2025-08-26 SDOH — ECONOMIC STABILITY: FOOD INSECURITY: WITHIN THE PAST 12 MONTHS, THE FOOD YOU BOUGHT JUST DIDN'T LAST AND YOU DIDN'T HAVE MONEY TO GET MORE.: NEVER TRUE

## 2025-08-26 SDOH — ECONOMIC STABILITY: TRANSPORTATION INSECURITY
IN THE PAST 12 MONTHS, HAS THE LACK OF TRANSPORTATION KEPT YOU FROM MEDICAL APPOINTMENTS OR FROM GETTING MEDICATIONS?: NO

## 2025-08-26 ASSESSMENT — PATIENT HEALTH QUESTIONNAIRE - PHQ9
SUM OF ALL RESPONSES TO PHQ QUESTIONS 1-9: 0
1. LITTLE INTEREST OR PLEASURE IN DOING THINGS: NOT AT ALL
1. LITTLE INTEREST OR PLEASURE IN DOING THINGS: NOT AT ALL
SUM OF ALL RESPONSES TO PHQ QUESTIONS 1-9: 0
2. FEELING DOWN, DEPRESSED OR HOPELESS: NOT AT ALL
SUM OF ALL RESPONSES TO PHQ QUESTIONS 1-9: 0
SUM OF ALL RESPONSES TO PHQ QUESTIONS 1-9: 0
SUM OF ALL RESPONSES TO PHQ9 QUESTIONS 1 & 2: 0
2. FEELING DOWN, DEPRESSED OR HOPELESS: NOT AT ALL

## 2025-08-28 ENCOUNTER — OFFICE VISIT (OUTPATIENT)
Dept: FAMILY MEDICINE CLINIC | Age: 70
End: 2025-08-28
Payer: MEDICARE

## 2025-08-28 VITALS
DIASTOLIC BLOOD PRESSURE: 82 MMHG | SYSTOLIC BLOOD PRESSURE: 138 MMHG | HEIGHT: 66 IN | BODY MASS INDEX: 20.17 KG/M2 | OXYGEN SATURATION: 99 % | HEART RATE: 62 BPM | WEIGHT: 125.5 LBS

## 2025-08-28 DIAGNOSIS — Z00.00 MEDICARE ANNUAL WELLNESS VISIT, SUBSEQUENT: Primary | ICD-10-CM

## 2025-08-28 DIAGNOSIS — I10 PRIMARY HYPERTENSION: ICD-10-CM

## 2025-08-28 DIAGNOSIS — R73.03 PRE-DIABETES: ICD-10-CM

## 2025-08-28 DIAGNOSIS — E78.2 MIXED HYPERLIPIDEMIA: ICD-10-CM

## 2025-08-28 PROCEDURE — 99214 OFFICE O/P EST MOD 30 MIN: CPT

## 2025-08-28 PROCEDURE — 1160F RVW MEDS BY RX/DR IN RCRD: CPT

## 2025-08-28 PROCEDURE — 3079F DIAST BP 80-89 MM HG: CPT

## 2025-08-28 PROCEDURE — 1159F MED LIST DOCD IN RCRD: CPT

## 2025-08-28 PROCEDURE — 1123F ACP DISCUSS/DSCN MKR DOCD: CPT

## 2025-08-28 PROCEDURE — G0439 PPPS, SUBSEQ VISIT: HCPCS

## 2025-08-28 PROCEDURE — 3075F SYST BP GE 130 - 139MM HG: CPT

## 2025-08-28 ASSESSMENT — LIFESTYLE VARIABLES
HOW OFTEN DO YOU HAVE A DRINK CONTAINING ALCOHOL: 4 OR MORE TIMES A WEEK
HOW OFTEN DURING THE LAST YEAR HAVE YOU FOUND THAT YOU WERE NOT ABLE TO STOP DRINKING ONCE YOU HAD STARTED: NEVER
HOW OFTEN DURING THE LAST YEAR HAVE YOU HAD A FEELING OF GUILT OR REMORSE AFTER DRINKING: NEVER
HOW OFTEN DURING THE LAST YEAR HAVE YOU BEEN UNABLE TO REMEMBER WHAT HAPPENED THE NIGHT BEFORE BECAUSE YOU HAD BEEN DRINKING: NEVER
HAS A RELATIVE, FRIEND, DOCTOR, OR ANOTHER HEALTH PROFESSIONAL EXPRESSED CONCERN ABOUT YOUR DRINKING OR SUGGESTED YOU CUT DOWN: NO
HOW OFTEN DURING THE LAST YEAR HAVE YOU FAILED TO DO WHAT WAS NORMALLY EXPECTED FROM YOU BECAUSE OF DRINKING: NEVER
HOW MANY STANDARD DRINKS CONTAINING ALCOHOL DO YOU HAVE ON A TYPICAL DAY: 1 OR 2
HAVE YOU OR SOMEONE ELSE BEEN INJURED AS A RESULT OF YOUR DRINKING: NO
HOW OFTEN DURING THE LAST YEAR HAVE YOU NEEDED AN ALCOHOLIC DRINK FIRST THING IN THE MORNING TO GET YOURSELF GOING AFTER A NIGHT OF HEAVY DRINKING: NEVER